# Patient Record
Sex: FEMALE | Race: WHITE | NOT HISPANIC OR LATINO | Employment: OTHER | URBAN - NONMETROPOLITAN AREA
[De-identification: names, ages, dates, MRNs, and addresses within clinical notes are randomized per-mention and may not be internally consistent; named-entity substitution may affect disease eponyms.]

---

## 2017-02-23 ENCOUNTER — OFFICE VISIT (OUTPATIENT)
Dept: INTERNAL MEDICINE | Facility: CLINIC | Age: 57
End: 2017-02-23

## 2017-02-23 VITALS
OXYGEN SATURATION: 98 % | RESPIRATION RATE: 12 BRPM | WEIGHT: 128 LBS | HEART RATE: 72 BPM | DIASTOLIC BLOOD PRESSURE: 80 MMHG | SYSTOLIC BLOOD PRESSURE: 112 MMHG | BODY MASS INDEX: 20.09 KG/M2 | HEIGHT: 67 IN

## 2017-02-23 DIAGNOSIS — E78.49 OTHER HYPERLIPIDEMIA: ICD-10-CM

## 2017-02-23 DIAGNOSIS — K63.89: ICD-10-CM

## 2017-02-23 DIAGNOSIS — M85.80 OSTEOPENIA: Primary | ICD-10-CM

## 2017-02-23 DIAGNOSIS — K21.00 GASTROESOPHAGEAL REFLUX DISEASE WITH ESOPHAGITIS: ICD-10-CM

## 2017-02-23 PROBLEM — E78.5 HYPERLIPIDEMIA: Status: ACTIVE | Noted: 2017-02-23

## 2017-02-23 PROBLEM — R19.8 PAIN ASSOCIATED WITH DEFECATION: Status: ACTIVE | Noted: 2017-02-23

## 2017-02-23 LAB
ALBUMIN SERPL-MCNC: 4.4 G/DL (ref 3.2–4.8)
ALBUMIN/GLOB SERPL: 1.7 G/DL (ref 1.5–2.5)
ALP SERPL-CCNC: 72 U/L (ref 25–100)
ALT SERPL W P-5'-P-CCNC: 14 U/L (ref 7–40)
ANION GAP SERPL CALCULATED.3IONS-SCNC: 2 MMOL/L (ref 3–11)
ARTICHOKE IGE QN: 187 MG/DL (ref 0–130)
AST SERPL-CCNC: 19 U/L (ref 0–33)
BILIRUB SERPL-MCNC: 0.6 MG/DL (ref 0.3–1.2)
BUN BLD-MCNC: 11 MG/DL (ref 9–23)
BUN/CREAT SERPL: 15.7 (ref 7–25)
CALCIUM SPEC-SCNC: 10 MG/DL (ref 8.7–10.4)
CHLORIDE SERPL-SCNC: 103 MMOL/L (ref 99–109)
CHOLEST SERPL-MCNC: 282 MG/DL (ref 0–200)
CO2 SERPL-SCNC: 35 MMOL/L (ref 20–31)
CREAT BLD-MCNC: 0.7 MG/DL (ref 0.6–1.3)
GFR SERPL CREATININE-BSD FRML MDRD: 87 ML/MIN/1.73
GLOBULIN UR ELPH-MCNC: 2.6 GM/DL
GLUCOSE BLD-MCNC: 81 MG/DL (ref 70–100)
HDLC SERPL-MCNC: 76 MG/DL (ref 40–60)
POTASSIUM BLD-SCNC: 4.1 MMOL/L (ref 3.5–5.5)
PROT SERPL-MCNC: 7 G/DL (ref 5.7–8.2)
SODIUM BLD-SCNC: 140 MMOL/L (ref 132–146)
TRIGL SERPL-MCNC: 140 MG/DL (ref 0–150)

## 2017-02-23 PROCEDURE — 36415 COLL VENOUS BLD VENIPUNCTURE: CPT | Performed by: FAMILY MEDICINE

## 2017-02-23 PROCEDURE — 99396 PREV VISIT EST AGE 40-64: CPT | Performed by: FAMILY MEDICINE

## 2017-02-23 PROCEDURE — 80061 LIPID PANEL: CPT | Performed by: FAMILY MEDICINE

## 2017-02-23 PROCEDURE — 99213 OFFICE O/P EST LOW 20 MIN: CPT | Performed by: FAMILY MEDICINE

## 2017-02-23 PROCEDURE — 80053 COMPREHEN METABOLIC PANEL: CPT | Performed by: FAMILY MEDICINE

## 2017-02-23 RX ORDER — OMEPRAZOLE 40 MG/1
40 CAPSULE, DELAYED RELEASE ORAL DAILY
Qty: 90 CAPSULE | Refills: 1 | Status: SHIPPED | OUTPATIENT
Start: 2017-02-23 | End: 2017-07-26

## 2017-02-23 RX ORDER — METRONIDAZOLE 500 MG/1
500 TABLET ORAL 3 TIMES DAILY
Qty: 30 TABLET | Refills: 0 | Status: SHIPPED | OUTPATIENT
Start: 2017-02-23 | End: 2017-03-05

## 2017-02-23 RX ORDER — CALCIUM CARBONATE 200(500)MG
TABLET,CHEWABLE ORAL
COMMUNITY
Start: 2013-07-22

## 2017-02-23 RX ORDER — CIPROFLOXACIN 500 MG/1
500 TABLET, FILM COATED ORAL 2 TIMES DAILY
Qty: 20 TABLET | Refills: 0 | Status: SHIPPED | OUTPATIENT
Start: 2017-02-23 | End: 2017-03-05

## 2017-02-23 RX ORDER — OMEPRAZOLE 40 MG/1
CAPSULE, DELAYED RELEASE ORAL DAILY
COMMUNITY
Start: 2015-02-20 | End: 2017-02-23 | Stop reason: SDUPTHER

## 2017-02-23 RX ORDER — FEXOFENADINE HCL 180 MG/1
TABLET ORAL AS NEEDED
COMMUNITY
Start: 2013-07-22 | End: 2019-10-01

## 2017-02-23 NOTE — PATIENT INSTRUCTIONS
"A. Probiotics - essentially foods and supplements that contain good bacteria            1. over the counter tabs with at least 1 billion CFUs/serving  (look for lactobacillus, saccharomyces, aspergillus) once to twice a day,            2.  Live culture yogurts (that include lactobacilli & bifidobacteria)            3. Kefir            4. Other foods don't have as much research supporting them but include miso, sauerkraut, buttermilk, kimchi,   B. Prebiotics - essentially a food/supplement that feeds the probiotics that you eat (typically is non-digestible by your intestines) - you want about 15 g of these a day            1.  High fiber foods            2.  High \"inulin\" containing foods - banana, chicory root, dandelion greens, garlic, artichokes, jicama, onion - recommended raw)            3. Garlic, asparagus, marsha, whole wheat flour, wheat bran,            4. Lactulose - actually a constipation medical treatment - can also be used as a prebiotic      "

## 2017-02-23 NOTE — PROGRESS NOTES
Physical. Has had a flare-up of acid reflux and right side abd pain, same as pain she had last year. She had constipation again like last time.     Subjective   Marni Hussein is a 56 y.o. female and is here for a comprehensive physical exam. The patient reports problems - see below.    Abdominal pain: getting a new RLQ abdominal pain, similar to the episode in 2015.  Not as severe.  She thinks it was triggered by an episode of constipation.  She took some dulcolax which got a lot of stuff moving, and the pain improved but still getting a burning pain.      GERD  Paitent complains of heartburn. This has been associated with bilious reflux, difficulty swallowing, heartburn, midespigastric pain, nocturnal burning, symptoms primarily relate to meals, and lying down after meals and upper abdominal discomfort.  She denies belching, belching and eructation, dysphagia, early satiety, fullness after meals, regurgitation of undigested food and unexpected weight loss. Symptoms have been present for several months. She denies dysphagia. She has not lost weight. She denies melena, hematochezia, hematemesis, and coffee ground emesis. Medical therapy in the past has included antacids.- - tums.  She had been symptom free for several months, but then this winter has been eating more acidic foods.          Do you take any herbs or supplements that were not prescribed by a doctor? no  Are you taking calcium supplements? Yes  Are you taking aspirin daily? N/A     History:  LMP: Menopause  Menopause: Yes  Last pap date: 2016  Abnormal pap? yes but many years ago.    Period Duration (Days): MENOPAUSE @ 46    OB History    Para Term  AB SAB TAB Ectopic Multiple Living   0 0 0 0 0 0 0 0 0 0               The following portions of the patient's history were reviewed and updated as appropriate: She  has a past medical history of Anemia; Breast injury; Depression; Fibrocystic breast; and Peptic ulceration.  She has  Gastroesophageal reflux disease with esophagitis; Hyperlipidemia; and Osteopenia on her pertinent problem list.  She  has a past surgical history that includes Breast excisional biopsy (Right); Breast cyst excision (1980); and Pelvic laparoscopy (2002).  Her family history includes Asthma in her mother; Brain cancer in her maternal aunt; Breast cancer (age of onset: 54) in her mother; Cancer in her mother; Fibromyalgia in her sister; ANTOLIN disease in her mother; Heart attack in her maternal grandfather, maternal uncle, and paternal uncle; Heart disease in her maternal aunt, maternal grandfather, maternal grandmother, maternal uncle, mother, paternal grandfather, paternal uncle, and paternal uncle; Hyperlipidemia in her brother, father, maternal aunt, maternal aunt, maternal grandfather, maternal grandmother, maternal uncle, mother, paternal grandfather, paternal uncle, paternal uncle, and sister; Hypertension in her brother, father, maternal aunt, maternal aunt, maternal grandfather, maternal grandmother, maternal uncle, maternal uncle, mother, paternal aunt, paternal grandfather, paternal uncle, paternal uncle, and sister; Stroke in her maternal aunt, maternal grandmother, and paternal grandfather; Tuberculosis in her paternal grandmother. There is no history of Ovarian cancer.  She  reports that she has never smoked. She has never used smokeless tobacco. She reports that she drinks alcohol. She reports that she does not use illicit drugs..    Review of Systems  Do you have pain that bothers you in your daily life? no    Review of Systems   Constitutional: Negative for appetite change, chills, fatigue, fever and unexpected weight change.        No malaise   HENT: Negative for ear pain, hearing loss, nosebleeds, rhinorrhea, sore throat, trouble swallowing and voice change.    Eyes: Negative for pain, discharge, redness, itching and visual disturbance.        No dry eyes   Respiratory: Negative for cough, shortness of  "breath and wheezing.         No SOB with exertion  No SOB lying down   Cardiovascular: Negative for chest pain, palpitations and leg swelling.        No leg cramps     Gastrointestinal: Positive for abdominal pain and constipation. Negative for blood in stool, diarrhea, nausea and vomiting.        No change in bowel habits  +heartburn   Endocrine: Negative for cold intolerance, heat intolerance, polydipsia, polyphagia and polyuria.        No hot flashes  No muscle weakness  No feeling of weakness   Genitourinary: Negative for difficulty urinating, dyspareunia, dysuria, frequency, hematuria, menstrual problem, pelvic pain, urgency, vaginal discharge and vaginal pain.        No urinary incontinence  No change in periods   Musculoskeletal: Negative for arthralgias, joint swelling and myalgias.        No limb pain  No limb swelling   Skin: Negative for rash and wound.        No rash  No lesions  No change in mole  No itching   Neurological: Negative for dizziness, seizures, syncope, weakness, numbness and headaches.        No confusion  No tingling  No trouble walking   Hematological: Negative for adenopathy. Does not bruise/bleed easily.   Psychiatric/Behavioral: Negative for confusion, dysphoric mood, sleep disturbance and suicidal ideas. The patient is not nervous/anxious.         No change in personality         Objective   Visit Vitals   • /80   • Pulse 72   • Resp 12   • Ht 66.5\" (168.9 cm)   • Wt 128 lb (58.1 kg)   • SpO2 98%   • BMI 20.35 kg/m2       Physical Exam   Constitutional: She is oriented to person, place, and time. She appears well-developed and well-nourished. No distress.   HENT:   Head: Normocephalic and atraumatic.   Right Ear: Tympanic membrane, external ear and ear canal normal.   Left Ear: Tympanic membrane, external ear and ear canal normal.   Nose: No rhinorrhea.   Mouth/Throat: Uvula is midline, oropharynx is clear and moist and mucous membranes are normal. No posterior oropharyngeal " erythema. No tonsillar exudate.   Eyes: Conjunctivae and lids are normal. Pupils are equal, round, and reactive to light. Right eye exhibits no discharge. Left eye exhibits no discharge. No scleral icterus.   Neck: Trachea normal, normal range of motion and phonation normal. Neck supple. No thyroid mass and no thyromegaly present.   Cardiovascular: Normal rate, regular rhythm and normal heart sounds.    No murmur heard.  Pulmonary/Chest: Effort normal and breath sounds normal.   Abdominal: Soft. Normal appearance. There is no splenomegaly or hepatomegaly. There is tenderness in the right lower quadrant. There is no rebound and no guarding. No hernia.   Musculoskeletal: Normal range of motion. She exhibits no edema, tenderness or deformity.   Lymphadenopathy:        Head (right side): No submental, no submandibular, no preauricular and no posterior auricular adenopathy present.        Head (left side): No submental, no submandibular, no preauricular and no posterior auricular adenopathy present.     She has no cervical adenopathy.        Right: No supraclavicular adenopathy present.        Left: No supraclavicular adenopathy present.   Neurological: She is alert and oriented to person, place, and time. She has normal strength.   Reflex Scores:       Patellar reflexes are 2+ on the right side and 2+ on the left side.  Skin: Skin is warm and dry. No rash noted. No pallor.   Psychiatric: She has a normal mood and affect. Her behavior is normal. Judgment and thought content normal.          Assessment/Plan   Healthy female exam.     1. 1. Osteopenia    - DEXA Bone Density Axial    2. Gastroesophageal reflux disease with esophagitis  worsening  - omeprazole (priLOSEC) 40 MG capsule; Take 1 capsule by mouth Daily.  Dispense: 90 capsule; Refill: 1  - Comprehensive Metabolic Panel; Future  - Comprehensive Metabolic Panel    3. Other hyperlipidemia  recheck  - Lipid Panel; Future  - Comprehensive Metabolic Panel; Future  -  Lipid Panel  - Comprehensive Metabolic Panel    4. Ileocecal valve syndrome  recurrent  - ciprofloxacin (CIPRO) 500 MG tablet; Take 1 tablet by mouth 2 (Two) Times a Day for 10 days.  Dispense: 20 tablet; Refill: 0  - metroNIDAZOLE (FLAGYL) 500 MG tablet; Take 1 tablet by mouth 3 (Three) Times a Day for 10 days.  Dispense: 30 tablet; Refill: 0      2. Patient Counseling:  --Nutrition: Stressed importance of moderation in sodium/caffeine intake, saturated fat and cholesterol, caloric balance, sufficient intake of fresh fruits, vegetables, fiber, calcium, iron, and 1 g folate supplementation if of childbearing age.   --Discussed the issue of calcium supplement, and the daily use of baby aspirin if applicable.  --Exercise: Stressed the importance of regular exercise.   --Substance Abuse: Discussed cessation/primary prevention of tobacco (if applicable), alcohol, or other drug use (if applicable); driving or other dangerous activities under the influence; availability of treatment for abuse.    --Sexuality: Discussed sexually transmitted diseases, partner selection, use of condoms, avoidance of unintended pregnancy  and contraceptive alternatives.   --Injury prevention: Discussed safety belts, safety helmets, smoke detector, smoking near bedding or upholstery.   --Dental health: Discussed importance of regular tooth brushing, flossing, and dental visits.  --Immunizations reviewed.  --Discussed benefits of screening colonoscopy (if applicable).  --After hours service discussed with patient    3. Discussed the patient's BMI with her.  The BMI is in the acceptable range  4. Follow up in 6 months

## 2017-06-06 ENCOUNTER — TELEPHONE (OUTPATIENT)
Dept: INTERNAL MEDICINE | Facility: CLINIC | Age: 57
End: 2017-06-06

## 2017-06-06 NOTE — TELEPHONE ENCOUNTER
Patient said she got an email saying there was a message waiting from her doctor. She said she had to answer all these questions before it would pull the message up. She said she was afraid of doing it in case it was a scam or something, so she figured she'd give us a call.    She also said she signed a packet for a living will a while ago, and it was supposed to be faxed somewhere and then returned to us and she said she noticed it wasn't in her MyChart, so she wanted to know the status of where that was.    She'd like a callback.

## 2017-06-08 NOTE — TELEPHONE ENCOUNTER
Spoke with pt. She said she received an e-mail from [employer] that was from her doctor's office.  Asked a bunch of questions, she answered a few then stopped in case it was a scam.     Biometric screening was faxed 3-30-17. She said her company did not get it. Will fax again today and leave copy for her at . She will stop by and pick it up.

## 2017-07-14 ENCOUNTER — TRANSCRIBE ORDERS (OUTPATIENT)
Dept: MAMMOGRAPHY | Facility: HOSPITAL | Age: 57
End: 2017-07-14

## 2017-07-14 DIAGNOSIS — Z12.31 VISIT FOR SCREENING MAMMOGRAM: Primary | ICD-10-CM

## 2017-07-26 ENCOUNTER — OFFICE VISIT (OUTPATIENT)
Dept: INTERNAL MEDICINE | Facility: CLINIC | Age: 57
End: 2017-07-26

## 2017-07-26 VITALS
DIASTOLIC BLOOD PRESSURE: 62 MMHG | TEMPERATURE: 98.3 F | WEIGHT: 134 LBS | OXYGEN SATURATION: 96 % | SYSTOLIC BLOOD PRESSURE: 104 MMHG | HEIGHT: 67 IN | RESPIRATION RATE: 12 BRPM | HEART RATE: 72 BPM | BODY MASS INDEX: 21.03 KG/M2

## 2017-07-26 DIAGNOSIS — G89.29 CHRONIC LEFT SHOULDER PAIN: ICD-10-CM
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
## 2017-07-26 DIAGNOSIS — M25.512 CHRONIC LEFT SHOULDER PAIN: ICD-10-CM

## 2017-07-26 DIAGNOSIS — S70.361A TICK BITE OF RIGHT THIGH, INITIAL ENCOUNTER: Primary | ICD-10-CM

## 2017-07-26 PROCEDURE — 99213 OFFICE O/P EST LOW 20 MIN: CPT | Performed by: NURSE PRACTITIONER

## 2017-07-26 NOTE — PROGRESS NOTES
Chief Complaint / Reason:      Chief Complaint   Patient presents with   • Tick Removal     TICK IN LEG       Subjective     HPI  Patient presents today with complaints of tick bite on her right leg specifically upper thigh.  She states that she was in [place] at her family's home [place] a [place] where she obtained the tick.  She denies being in the lids or any tall grass but states her family has a inside pet that does go outside to use the bathroom.  The tick was found and it appeared to be embedded in her scan and her family called it off but was able to remove a small portion that currently remains in her skin.  The site does not appear to be infected nor did the patient have a fever at this time.  Patient denied any neurological symptoms.  She states that she gets take baths frequently and does not use any repellent.  Patient also states that her left shoulder has been giving her a lot of trouble, states that she had an MRI in the past which showed bulging disc in her cervical region.  Patient denies numbness or tingling but does state that it does shoot pain from her neck down to her.  Patient denies chest pain, shortness of breath, or heart palpitations.  History taken from: patient    PMH/FH/Social History were reviewed and updated appropriately in the electronic medical record.     Review of Systems:   Review of Systems   Constitutional: Negative.    HENT: Negative.    Respiratory: Negative.    Cardiovascular: Negative.    Skin:        Tick bite right upper thigh     Neurological: Negative.  Negative for weakness and headaches.   Hematological: Negative for adenopathy.     All other systems were reviewed and are negative.  Exceptions are noted in the subjective or above.      Objective     Vital Signs  Vitals:    07/26/17 1427   BP: 104/62   Pulse: 72   Resp: 12   Temp: 98.3 °F (36.8 °C)   SpO2: 96%       Body mass index is 21.3 kg/(m^2).    Physical Exam   Constitutional: She is oriented to  person, place, and time. She appears well-developed.   HENT:   Head: Normocephalic.   Cardiovascular: Normal rate, regular rhythm, normal heart sounds and intact distal pulses.    Pulmonary/Chest: Effort normal.   Musculoskeletal: She exhibits tenderness.        Left shoulder: She exhibits decreased range of motion, tenderness and crepitus.   Neurological: She is alert and oriented to person, place, and time.   Skin: Skin is warm and dry. There is erythema.        The head of the tick removed by using the tip of the needle and tweezers.  The area was cleaned with Betadine and then with alcohol.  The remaining portion of the tick was removed cleaned again with Betadine and alcohol and a Band-Aid applied.  Scant bleeding noted.  Patient tolerated well   Nursing note and vitals reviewed.      Medication Review:     Current Outpatient Prescriptions:   •  calcium carbonate (TUMS) 500 MG chewable tablet, Chew., Disp: , Rfl:   •  CALCIUM-VITAMIN D PO, Take  by mouth Daily., Disp: , Rfl:   •  fexofenadine (ALLEGRA ALLERGY) 180 MG tablet, Take  by mouth As Needed., Disp: , Rfl:     Assessment/Plan   Marni was seen today for tick removal.    Diagnoses and all orders for this visit:    Tick bite of right thigh, initial encounter  Discussed worsening signs and symptoms and when to seek medical attention.  Discussed prevention management of tick bites  Recommend patient increase water intake.  Chronic left shoulder pain  Recommend patient avoid heavy lifting or pulling and tugging.  Recommend patient not lie on left side while sleeping.  Discussed worsening signs and symptoms  Arlette Becker, FRANCOISE  07/26/2017

## 2017-08-10 ENCOUNTER — HOSPITAL ENCOUNTER (OUTPATIENT)
Dept: MAMMOGRAPHY | Facility: HOSPITAL | Age: 57
Discharge: HOME | End: 2017-08-10
Attending: OBSTETRICS & GYNECOLOGY | Admitting: OBSTETRICS & GYNECOLOGY

## 2017-08-10 DIAGNOSIS — Z12.31 VISIT FOR SCREENING MAMMOGRAM: ICD-10-CM

## 2017-08-10 PROCEDURE — 77067 SCR MAMMO BI INCL CAD: CPT | Performed by: RADIOLOGY

## 2017-08-10 PROCEDURE — 77063 BREAST TOMOSYNTHESIS BI: CPT

## 2017-08-10 PROCEDURE — 77063 BREAST TOMOSYNTHESIS BI: CPT | Performed by: RADIOLOGY

## 2017-08-10 PROCEDURE — G0202 SCR MAMMO BI INCL CAD: HCPCS

## 2017-09-23 ENCOUNTER — ANESTHESIA EVENT (OUTPATIENT)
Dept: PERIOP | Facility: HOSPITAL | Age: 57
End: 2017-09-23

## 2017-09-23 ENCOUNTER — APPOINTMENT (OUTPATIENT)
Dept: CT IMAGING | Facility: HOSPITAL | Age: 57
End: 2017-09-23

## 2017-09-23 ENCOUNTER — ANESTHESIA (OUTPATIENT)
Dept: PERIOP | Facility: HOSPITAL | Age: 57
End: 2017-09-23

## 2017-09-23 ENCOUNTER — HOSPITAL ENCOUNTER (OUTPATIENT)
Facility: HOSPITAL | Age: 57
Discharge: HOME | End: 2017-09-24
Attending: EMERGENCY MEDICINE | Admitting: SURGERY

## 2017-09-23 ENCOUNTER — PREP FOR SURGERY (OUTPATIENT)
Dept: OTHER | Facility: HOSPITAL | Age: 57
End: 2017-09-23

## 2017-09-23 DIAGNOSIS — K35.80 ACUTE APPENDICITIS, UNSPECIFIED ACUTE APPENDICITIS TYPE: Primary | ICD-10-CM

## 2017-09-23 DIAGNOSIS — K35.30 ACUTE APPENDICITIS WITH LOCALIZED PERITONITIS: ICD-10-CM

## 2017-09-23 DIAGNOSIS — K35.30 ACUTE APPENDICITIS WITH LOCALIZED PERITONITIS: Primary | ICD-10-CM

## 2017-09-23 LAB
ALBUMIN SERPL-MCNC: 4.4 G/DL (ref 3.5–5)
ALBUMIN/GLOB SERPL: 1.6 G/DL (ref 1–2)
ALP SERPL-CCNC: 68 U/L (ref 38–126)
ALT SERPL W P-5'-P-CCNC: 28 U/L (ref 13–69)
ANION GAP SERPL CALCULATED.3IONS-SCNC: 17.7 MMOL/L
AST SERPL-CCNC: 20 U/L (ref 15–46)
B-HCG UR QL: NEGATIVE
BACTERIA UR QL AUTO: ABNORMAL /HPF
BASOPHILS # BLD AUTO: 0.02 10*3/MM3 (ref 0–0.2)
BASOPHILS NFR BLD AUTO: 0.1 % (ref 0–2.5)
BILIRUB SERPL-MCNC: 0.6 MG/DL (ref 0.2–1.3)
BILIRUB UR QL STRIP: NEGATIVE
BUN BLD-MCNC: 14 MG/DL (ref 7–20)
BUN/CREAT SERPL: 20 (ref 7.1–23.5)
CALCIUM SPEC-SCNC: 9.3 MG/DL (ref 8.4–10.2)
CHLORIDE SERPL-SCNC: 104 MMOL/L (ref 98–107)
CLARITY UR: CLEAR
CO2 SERPL-SCNC: 23 MMOL/L (ref 26–30)
COLOR UR: YELLOW
CREAT BLD-MCNC: 0.7 MG/DL (ref 0.6–1.3)
DEPRECATED RDW RBC AUTO: 40.5 FL (ref 37–54)
EOSINOPHIL # BLD AUTO: 0 10*3/MM3 (ref 0–0.7)
EOSINOPHIL NFR BLD AUTO: 0 % (ref 0–7)
ERYTHROCYTE [DISTWIDTH] IN BLOOD BY AUTOMATED COUNT: 12.4 % (ref 11.5–14.5)
GFR SERPL CREATININE-BSD FRML MDRD: 86 ML/MIN/1.73
GLOBULIN UR ELPH-MCNC: 2.7 GM/DL
GLUCOSE BLD-MCNC: 156 MG/DL (ref 74–98)
GLUCOSE UR STRIP-MCNC: NEGATIVE MG/DL
HCT VFR BLD AUTO: 37.8 % (ref 37–47)
HGB BLD-MCNC: 12.4 G/DL (ref 12–16)
HGB UR QL STRIP.AUTO: ABNORMAL
HYALINE CASTS UR QL AUTO: ABNORMAL /LPF
IMM GRANULOCYTES # BLD: 0.07 10*3/MM3 (ref 0–0.06)
IMM GRANULOCYTES NFR BLD: 0.5 % (ref 0–0.6)
KETONES UR QL STRIP: ABNORMAL
LEUKOCYTE ESTERASE UR QL STRIP.AUTO: NEGATIVE
LIPASE SERPL-CCNC: 83 U/L (ref 23–300)
LYMPHOCYTES # BLD AUTO: 0.58 10*3/MM3 (ref 0.6–3.4)
LYMPHOCYTES NFR BLD AUTO: 4 % (ref 10–50)
MCH RBC QN AUTO: 29.8 PG (ref 27–31)
MCHC RBC AUTO-ENTMCNC: 32.8 G/DL (ref 30–37)
MCV RBC AUTO: 90.9 FL (ref 81–99)
MONOCYTES # BLD AUTO: 0.94 10*3/MM3 (ref 0–0.9)
MONOCYTES NFR BLD AUTO: 6.5 % (ref 0–12)
NEUTROPHILS # BLD AUTO: 12.84 10*3/MM3 (ref 2–6.9)
NEUTROPHILS NFR BLD AUTO: 88.9 % (ref 37–80)
NITRITE UR QL STRIP: NEGATIVE
NRBC BLD MANUAL-RTO: 0 /100 WBC (ref 0–0)
PH UR STRIP.AUTO: 7.5 [PH] (ref 5–8)
PLATELET # BLD AUTO: 257 10*3/MM3 (ref 130–400)
PMV BLD AUTO: 10.9 FL (ref 6–12)
POTASSIUM BLD-SCNC: 3.7 MMOL/L (ref 3.5–5.1)
PROT SERPL-MCNC: 7.1 G/DL (ref 6.3–8.2)
PROT UR QL STRIP: NEGATIVE
RBC # BLD AUTO: 4.16 10*6/MM3 (ref 4.2–5.4)
RBC # UR: ABNORMAL /HPF
REF LAB TEST METHOD: ABNORMAL
SODIUM BLD-SCNC: 141 MMOL/L (ref 137–145)
SP GR UR STRIP: 1.01 (ref 1–1.03)
SQUAMOUS #/AREA URNS HPF: ABNORMAL /HPF
UROBILINOGEN UR QL STRIP: ABNORMAL
WBC NRBC COR # BLD: 14.45 10*3/MM3 (ref 4.8–10.8)
WBC UR QL AUTO: ABNORMAL /HPF

## 2017-09-23 PROCEDURE — 25010000002 DEXAMETHASONE PER 1 MG: Performed by: NURSE ANESTHETIST, CERTIFIED REGISTERED

## 2017-09-23 PROCEDURE — 81025 URINE PREGNANCY TEST: CPT | Performed by: PHYSICIAN ASSISTANT

## 2017-09-23 PROCEDURE — 99024 POSTOP FOLLOW-UP VISIT: CPT | Performed by: SURGERY

## 2017-09-23 PROCEDURE — 74177 CT ABD & PELVIS W/CONTRAST: CPT

## 2017-09-23 PROCEDURE — 80053 COMPREHEN METABOLIC PANEL: CPT | Performed by: PHYSICIAN ASSISTANT

## 2017-09-23 PROCEDURE — 99220 PR INITIAL OBSERVATION CARE/DAY 70 MINUTES: CPT | Performed by: SURGERY

## 2017-09-23 PROCEDURE — 44970 LAPAROSCOPY APPENDECTOMY: CPT | Performed by: SURGERY

## 2017-09-23 PROCEDURE — 25010000002 ONDANSETRON PER 1 MG: Performed by: EMERGENCY MEDICINE

## 2017-09-23 PROCEDURE — 81001 URINALYSIS AUTO W/SCOPE: CPT | Performed by: PHYSICIAN ASSISTANT

## 2017-09-23 PROCEDURE — 96374 THER/PROPH/DIAG INJ IV PUSH: CPT

## 2017-09-23 PROCEDURE — 25010000002 ONDANSETRON PER 1 MG: Performed by: NURSE ANESTHETIST, CERTIFIED REGISTERED

## 2017-09-23 PROCEDURE — 25010000002 FENTANYL CITRATE (PF) 100 MCG/2ML SOLUTION: Performed by: NURSE ANESTHETIST, CERTIFIED REGISTERED

## 2017-09-23 PROCEDURE — 99284 EMERGENCY DEPT VISIT MOD MDM: CPT

## 2017-09-23 PROCEDURE — 25010000002 PROPOFOL 200 MG/20ML EMULSION: Performed by: NURSE ANESTHETIST, CERTIFIED REGISTERED

## 2017-09-23 PROCEDURE — 93005 ELECTROCARDIOGRAM TRACING: CPT | Performed by: PHYSICIAN ASSISTANT

## 2017-09-23 PROCEDURE — 85025 COMPLETE CBC W/AUTO DIFF WBC: CPT | Performed by: PHYSICIAN ASSISTANT

## 2017-09-23 PROCEDURE — G0378 HOSPITAL OBSERVATION PER HR: HCPCS

## 2017-09-23 PROCEDURE — 25010000002 KETOROLAC TROMETHAMINE PER 15 MG: Performed by: NURSE ANESTHETIST, CERTIFIED REGISTERED

## 2017-09-23 PROCEDURE — 0 IOPAMIDOL 61 % SOLUTION: Performed by: STUDENT IN AN ORGANIZED HEALTH CARE EDUCATION/TRAINING PROGRAM

## 2017-09-23 PROCEDURE — 96361 HYDRATE IV INFUSION ADD-ON: CPT

## 2017-09-23 PROCEDURE — 83690 ASSAY OF LIPASE: CPT | Performed by: PHYSICIAN ASSISTANT

## 2017-09-23 PROCEDURE — 25010000002 MIDAZOLAM PER 1 MG: Performed by: NURSE ANESTHETIST, CERTIFIED REGISTERED

## 2017-09-23 RX ORDER — NEOSTIGMINE METHYLSULFATE 5 MG/5 ML
SYRINGE (ML) INTRAVENOUS AS NEEDED
Status: DISCONTINUED | OUTPATIENT
Start: 2017-09-23 | End: 2017-09-23 | Stop reason: SURG

## 2017-09-23 RX ORDER — FENTANYL CITRATE 50 UG/ML
INJECTION, SOLUTION INTRAMUSCULAR; INTRAVENOUS AS NEEDED
Status: DISCONTINUED | OUTPATIENT
Start: 2017-09-23 | End: 2017-09-23 | Stop reason: SURG

## 2017-09-23 RX ORDER — MEPERIDINE HYDROCHLORIDE 25 MG/ML
25 INJECTION INTRAMUSCULAR; INTRAVENOUS; SUBCUTANEOUS
Status: DISCONTINUED | OUTPATIENT
Start: 2017-09-23 | End: 2017-09-23

## 2017-09-23 RX ORDER — ONDANSETRON 4 MG/1
4 TABLET, FILM COATED ORAL ONCE AS NEEDED
Status: DISCONTINUED | OUTPATIENT
Start: 2017-09-23 | End: 2017-09-23

## 2017-09-23 RX ORDER — SODIUM CHLORIDE 9 MG/ML
100 INJECTION, SOLUTION INTRAVENOUS CONTINUOUS
Status: CANCELLED | OUTPATIENT
Start: 2017-09-23

## 2017-09-23 RX ORDER — PROMETHAZINE HYDROCHLORIDE 25 MG/ML
6.25 INJECTION, SOLUTION INTRAMUSCULAR; INTRAVENOUS ONCE
Status: DISCONTINUED | OUTPATIENT
Start: 2017-09-23 | End: 2017-09-23

## 2017-09-23 RX ORDER — SODIUM CHLORIDE, SODIUM LACTATE, POTASSIUM CHLORIDE, CALCIUM CHLORIDE 600; 310; 30; 20 MG/100ML; MG/100ML; MG/100ML; MG/100ML
1000 INJECTION, SOLUTION INTRAVENOUS CONTINUOUS PRN
Status: DISCONTINUED | OUTPATIENT
Start: 2017-09-23 | End: 2017-09-23 | Stop reason: HOSPADM

## 2017-09-23 RX ORDER — SODIUM CHLORIDE 0.9 % (FLUSH) 0.9 %
3 SYRINGE (ML) INJECTION AS NEEDED
Status: DISCONTINUED | OUTPATIENT
Start: 2017-09-23 | End: 2017-09-23 | Stop reason: HOSPADM

## 2017-09-23 RX ORDER — ONDANSETRON 2 MG/ML
INJECTION INTRAMUSCULAR; INTRAVENOUS AS NEEDED
Status: DISCONTINUED | OUTPATIENT
Start: 2017-09-23 | End: 2017-09-23 | Stop reason: SURG

## 2017-09-23 RX ORDER — GLYCOPYRROLATE 0.2 MG/ML
INJECTION INTRAMUSCULAR; INTRAVENOUS AS NEEDED
Status: DISCONTINUED | OUTPATIENT
Start: 2017-09-23 | End: 2017-09-23 | Stop reason: SURG

## 2017-09-23 RX ORDER — ONDANSETRON 2 MG/ML
4 INJECTION INTRAMUSCULAR; INTRAVENOUS ONCE
Status: COMPLETED | OUTPATIENT
Start: 2017-09-23 | End: 2017-09-23

## 2017-09-23 RX ORDER — HYDROCODONE BITARTRATE AND ACETAMINOPHEN 7.5; 325 MG/1; MG/1
1 TABLET ORAL ONCE AS NEEDED
Status: DISCONTINUED | OUTPATIENT
Start: 2017-09-23 | End: 2017-09-23

## 2017-09-23 RX ORDER — IBUPROFEN 600 MG/1
600 TABLET ORAL EVERY 6 HOURS PRN
Status: DISCONTINUED | OUTPATIENT
Start: 2017-09-23 | End: 2017-09-23

## 2017-09-23 RX ORDER — PROMETHAZINE HYDROCHLORIDE 25 MG/ML
12.5 INJECTION, SOLUTION INTRAMUSCULAR; INTRAVENOUS ONCE AS NEEDED
Status: DISCONTINUED | OUTPATIENT
Start: 2017-09-23 | End: 2017-09-23

## 2017-09-23 RX ORDER — PROPOFOL 10 MG/ML
INJECTION, EMULSION INTRAVENOUS AS NEEDED
Status: DISCONTINUED | OUTPATIENT
Start: 2017-09-23 | End: 2017-09-23 | Stop reason: SURG

## 2017-09-23 RX ORDER — BUPIVACAINE HYDROCHLORIDE 5 MG/ML
INJECTION, SOLUTION EPIDURAL; INTRACAUDAL AS NEEDED
Status: DISCONTINUED | OUTPATIENT
Start: 2017-09-23 | End: 2017-09-23 | Stop reason: HOSPADM

## 2017-09-23 RX ORDER — ROCURONIUM BROMIDE 10 MG/ML
INJECTION, SOLUTION INTRAVENOUS AS NEEDED
Status: DISCONTINUED | OUTPATIENT
Start: 2017-09-23 | End: 2017-09-23 | Stop reason: SURG

## 2017-09-23 RX ORDER — BUPIVACAINE HYDROCHLORIDE 5 MG/ML
INJECTION, SOLUTION EPIDURAL; INTRACAUDAL
Status: DISCONTINUED
Start: 2017-09-23 | End: 2017-09-24 | Stop reason: HOSPADM

## 2017-09-23 RX ORDER — ONDANSETRON 2 MG/ML
4 INJECTION INTRAMUSCULAR; INTRAVENOUS ONCE AS NEEDED
Status: DISCONTINUED | OUTPATIENT
Start: 2017-09-23 | End: 2017-09-23

## 2017-09-23 RX ORDER — KETOROLAC TROMETHAMINE 30 MG/ML
INJECTION, SOLUTION INTRAMUSCULAR; INTRAVENOUS AS NEEDED
Status: DISCONTINUED | OUTPATIENT
Start: 2017-09-23 | End: 2017-09-23 | Stop reason: SURG

## 2017-09-23 RX ORDER — SODIUM CHLORIDE 9 MG/ML
INJECTION, SOLUTION INTRAVENOUS CONTINUOUS PRN
Status: DISCONTINUED | OUTPATIENT
Start: 2017-09-23 | End: 2017-09-23 | Stop reason: HOSPADM

## 2017-09-23 RX ORDER — SODIUM CHLORIDE 0.9 % (FLUSH) 0.9 %
10 SYRINGE (ML) INJECTION AS NEEDED
Status: DISCONTINUED | OUTPATIENT
Start: 2017-09-23 | End: 2017-09-23

## 2017-09-23 RX ORDER — MEPERIDINE HYDROCHLORIDE 50 MG/ML
INJECTION INTRAMUSCULAR; INTRAVENOUS; SUBCUTANEOUS AS NEEDED
Status: DISCONTINUED | OUTPATIENT
Start: 2017-09-23 | End: 2017-09-23 | Stop reason: SURG

## 2017-09-23 RX ORDER — DEXAMETHASONE SODIUM PHOSPHATE 4 MG/ML
INJECTION, SOLUTION INTRA-ARTICULAR; INTRALESIONAL; INTRAMUSCULAR; INTRAVENOUS; SOFT TISSUE AS NEEDED
Status: DISCONTINUED | OUTPATIENT
Start: 2017-09-23 | End: 2017-09-23 | Stop reason: SURG

## 2017-09-23 RX ORDER — MIDAZOLAM HYDROCHLORIDE 1 MG/ML
INJECTION INTRAMUSCULAR; INTRAVENOUS AS NEEDED
Status: DISCONTINUED | OUTPATIENT
Start: 2017-09-23 | End: 2017-09-23 | Stop reason: SURG

## 2017-09-23 RX ORDER — SODIUM CHLORIDE 9 MG/ML
100 INJECTION, SOLUTION INTRAVENOUS CONTINUOUS
Status: DISCONTINUED | OUTPATIENT
Start: 2017-09-23 | End: 2017-09-23

## 2017-09-23 RX ORDER — HYDROCODONE BITARTRATE AND ACETAMINOPHEN 7.5; 325 MG/1; MG/1
1-2 TABLET ORAL EVERY 4 HOURS PRN
Qty: 20 TABLET | Refills: 0 | Status: SHIPPED | OUTPATIENT
Start: 2017-09-23 | End: 2017-10-09

## 2017-09-23 RX ADMIN — Medication 3 MG: at 22:15

## 2017-09-23 RX ADMIN — SODIUM CHLORIDE, POTASSIUM CHLORIDE, SODIUM LACTATE AND CALCIUM CHLORIDE: 600; 310; 30; 20 INJECTION, SOLUTION INTRAVENOUS at 21:25

## 2017-09-23 RX ADMIN — ROCURONIUM BROMIDE 5 MG: 10 INJECTION INTRAVENOUS at 21:41

## 2017-09-23 RX ADMIN — DEXAMETHASONE SODIUM PHOSPHATE 4 MG: 4 INJECTION, SOLUTION INTRAMUSCULAR; INTRAVENOUS at 21:50

## 2017-09-23 RX ADMIN — FENTANYL CITRATE 100 MCG: 50 INJECTION, SOLUTION INTRAMUSCULAR; INTRAVENOUS at 21:42

## 2017-09-23 RX ADMIN — ROCURONIUM BROMIDE 5 MG: 10 INJECTION INTRAVENOUS at 21:55

## 2017-09-23 RX ADMIN — MEPERIDINE HYDROCHLORIDE 25 MG: 50 INJECTION INTRAMUSCULAR; INTRAVENOUS; SUBCUTANEOUS at 21:33

## 2017-09-23 RX ADMIN — FENTANYL CITRATE 50 MCG: 50 INJECTION, SOLUTION INTRAMUSCULAR; INTRAVENOUS at 22:10

## 2017-09-23 RX ADMIN — MIDAZOLAM HYDROCHLORIDE 1 MG: 1 INJECTION, SOLUTION INTRAMUSCULAR; INTRAVENOUS at 21:33

## 2017-09-23 RX ADMIN — LIDOCAINE HYDROCHLORIDE 40 MG: 20 INJECTION, SOLUTION INTRAVENOUS at 21:42

## 2017-09-23 RX ADMIN — ONDANSETRON 4 MG: 2 INJECTION INTRAMUSCULAR; INTRAVENOUS at 21:50

## 2017-09-23 RX ADMIN — KETOROLAC TROMETHAMINE 30 MG: 30 INJECTION, SOLUTION INTRAMUSCULAR at 22:22

## 2017-09-23 RX ADMIN — MEPERIDINE HYDROCHLORIDE 25 MG: 50 INJECTION INTRAMUSCULAR; INTRAVENOUS; SUBCUTANEOUS at 22:00

## 2017-09-23 RX ADMIN — ROCURONIUM BROMIDE 5 MG: 10 INJECTION INTRAVENOUS at 22:06

## 2017-09-23 RX ADMIN — SODIUM CHLORIDE 1000 ML: 9 INJECTION, SOLUTION INTRAVENOUS at 18:15

## 2017-09-23 RX ADMIN — FENTANYL CITRATE 50 MCG: 50 INJECTION, SOLUTION INTRAMUSCULAR; INTRAVENOUS at 21:59

## 2017-09-23 RX ADMIN — IOPAMIDOL 100 ML: 612 INJECTION, SOLUTION INTRAVENOUS at 19:20

## 2017-09-23 RX ADMIN — PROPOFOL 100 MG: 10 INJECTION, EMULSION INTRAVENOUS at 21:42

## 2017-09-23 RX ADMIN — ONDANSETRON 4 MG: 2 INJECTION INTRAMUSCULAR; INTRAVENOUS at 17:58

## 2017-09-23 RX ADMIN — GLYCOPYRROLATE 0.6 MG: 0.2 INJECTION, SOLUTION INTRAMUSCULAR; INTRAVENOUS at 22:15

## 2017-09-23 RX ADMIN — ROCURONIUM BROMIDE 15 MG: 10 INJECTION INTRAVENOUS at 21:42

## 2017-09-24 VITALS
DIASTOLIC BLOOD PRESSURE: 60 MMHG | TEMPERATURE: 99.5 F | HEIGHT: 67 IN | BODY MASS INDEX: 20.4 KG/M2 | OXYGEN SATURATION: 99 % | SYSTOLIC BLOOD PRESSURE: 113 MMHG | HEART RATE: 68 BPM | RESPIRATION RATE: 12 BRPM | WEIGHT: 130 LBS

## 2017-09-24 NOTE — ANESTHESIA PROCEDURE NOTES
Airway  Urgency: elective    Airway not difficult    General Information and Staff    Patient location during procedure: OR  CRNA: VIRIDIANA AVILA    Indications and Patient Condition  Indications for airway management: airway protection    Preoxygenated: yes      Final Airway Details  Final airway type: endotracheal airway      Successful airway: ETT  Cuffed: yes   Successful intubation technique: direct laryngoscopy  Facilitating devices/methods: intubating stylet and cricoid pressure  Endotracheal tube insertion site: oral  Blade: Howard  Blade size: #2  ETT size: 7.0 mm  Cormack-Lehane Classification: grade I - full view of glottis  Placement verified by: chest auscultation, capnometry and palpation of cuff   Measured from: lips  ETT to lips (cm): 21  Number of attempts at approach: 1    Additional Comments  RSI with adequate Cricoid pressure applied. Dentition and Lips as pre-induction. ETT cuff inflated to minimal occlusive pressure.

## 2017-09-24 NOTE — ANESTHESIA POSTPROCEDURE EVALUATION
Patient: Marni Hussein    Procedure Summary     Date Anesthesia Start Anesthesia Stop Room / Location    09/23/17 [id number]  [facility] OR 4 / [facility]       Procedure Diagnosis Surgeon Provider    APPENDECTOMY LAPAROSCOPIC (N/A Abdomen); APPENDECTOMY LAPAROSCOPIC (N/A Abdomen) Acute appendicitis with localized peritonitis  (ACUTE APPENDICITIS; Acute appendicitis with localized peritonitis [K35.3]) MD Ryan Ashraf CRNA          Anesthesia Type: general  Last vitals  BP     122/59   Temp     99.5   Pulse 76      Resp     13   SpO2 100         Post Anesthesia Care and Evaluation    Patient location during evaluation: PACU  Patient participation: complete - patient participated  Level of consciousness: awake  Pain score: 0  Pain management: adequate  Airway patency: patent  Anesthetic complications: No anesthetic complications  PONV Status: none  Cardiovascular status: acceptable  Respiratory status: acceptable, face mask, spontaneous ventilation and nonlabored ventilation  Hydration status: acceptable

## 2017-09-24 NOTE — ANESTHESIA PREPROCEDURE EVALUATION
Anesthesia Evaluation     Patient summary reviewed and Nursing notes reviewed   no history of anesthetic complications:  NPO Solid Status: > 8 hours  NPO Liquid Status: Waived due to emergency     Airway   Mallampati: I  TM distance: >3 FB  Neck ROM: full  no difficulty expected  Dental - normal exam     Pulmonary - negative pulmonary ROS and normal exam    breath sounds clear to auscultation  (-) not a smoker  Cardiovascular - normal exam    ECG reviewed  Rhythm: regular  Rate: normal    (+) hyperlipidemia    ROS comment: EKG   SR 84, low voltage in chest leads    Neuro/Psych  (+) psychiatric history Depression,    GI/Hepatic/Renal/Endo    (+)  GERD, PUD,     Musculoskeletal (-) negative ROS    Abdominal    Substance History - negative use     OB/GYN negative ob/gyn ROS         Other - negative ROS                                   Anesthesia Plan    ASA 2 - emergent     general   Rapid sequence(Risks and benefits of general anesthesia discussed including risk of aspiration, recall and dental damage. All patient questions answered.  Patient told a breathing tube will be used to manage the airway.    Will continue with plan of care.)  intravenous induction   Anesthetic plan and risks discussed with patient.
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
## 2017-09-28 LAB
LAB AP CASE REPORT: NORMAL
Lab: NORMAL
PATH REPORT.FINAL DX SPEC: NORMAL

## 2017-10-09 ENCOUNTER — OFFICE VISIT (OUTPATIENT)
Dept: SURGERY | Facility: CLINIC | Age: 57
End: 2017-10-09

## 2017-10-09 VITALS
BODY MASS INDEX: 20.09 KG/M2 | TEMPERATURE: 98.6 F | OXYGEN SATURATION: 99 % | RESPIRATION RATE: 16 BRPM | WEIGHT: 128 LBS | DIASTOLIC BLOOD PRESSURE: 64 MMHG | HEART RATE: 76 BPM | SYSTOLIC BLOOD PRESSURE: 102 MMHG | HEIGHT: 67 IN

## 2017-10-09 DIAGNOSIS — Z48.89 POSTOPERATIVE VISIT: Primary | ICD-10-CM

## 2017-10-09 PROCEDURE — 99024 POSTOP FOLLOW-UP VISIT: CPT | Performed by: SURGERY

## 2017-10-09 NOTE — PROGRESS NOTES
"Patient: Marni Hussein    YOB: 1960    Date: 10/09/2017    Primary Care Provider: Daniela Hoyos MD    Reason for Consultation: Follow-up laparoscopic appendectomy.    Chief Complaint:   Chief Complaint   Patient presents with   • Post-op     laparoscopic appendectomy.       History of present illness:  I saw the patient in the office today as a followup from their recent laparoscopic appendectomy which was done on 09/23/2017, pathology report showed marked acute transmural appendicitis and gross serosal defect.  They state that they have done well although she admits to \"soreness when moving around\" and are having no complaints.    Vital Signs:   Temp:  [98.6 °F (37 °C)] 98.6 °F (37 °C)  Heart Rate:  [76] 76  Resp:  [16] 16  BP: (102)/(64) 102/64    Physical Exam:   General Appearance:    Alert, cooperative, in no acute distress   Abdomen:     no masses, no organomegaly, soft non-tender, non-distended, no guarding, wounds are well healed     Assessment / Plan:    1. Postoperative visit        I did discuss the situation with the patient today in the office and they have done well from their recent laparoscopic appendectomy.  I have released the patient back to normal activity, they understand that they need to be careful about heavy lifting.  I need to see the patient back in the office only if they are having further problems, they know to call me if they are.    Electronically signed by Etienne Branham MD  10/09/17        Scribed for Etienne Branham MD by Diane Sanchez. 10/9/2017  9:23 AM            "

## 2017-10-27 ENCOUNTER — FLU SHOT (OUTPATIENT)
Dept: INTERNAL MEDICINE | Facility: CLINIC | Age: 57
End: 2017-10-27

## 2017-10-27 DIAGNOSIS — Z23 NEED FOR INFLUENZA VACCINE: ICD-10-CM

## 2017-10-27 PROCEDURE — 90686 IIV4 VACC NO PRSV 0.5 ML IM: CPT | Performed by: FAMILY MEDICINE

## 2017-10-27 PROCEDURE — 90471 IMMUNIZATION ADMIN: CPT | Performed by: FAMILY MEDICINE

## 2018-05-21 ENCOUNTER — OFFICE VISIT (OUTPATIENT)
Dept: INTERNAL MEDICINE | Facility: CLINIC | Age: 58
End: 2018-05-21

## 2018-05-21 VITALS
BODY MASS INDEX: 20.56 KG/M2 | OXYGEN SATURATION: 99 % | TEMPERATURE: 97.5 F | DIASTOLIC BLOOD PRESSURE: 70 MMHG | WEIGHT: 131 LBS | HEIGHT: 67 IN | HEART RATE: 62 BPM | SYSTOLIC BLOOD PRESSURE: 110 MMHG

## 2018-05-21 DIAGNOSIS — J32.9 VIRAL SINUSITIS: Primary | ICD-10-CM

## 2018-05-21 DIAGNOSIS — B97.89 VIRAL SINUSITIS: Primary | ICD-10-CM

## 2018-05-21 PROBLEM — K35.30 ACUTE APPENDICITIS WITH LOCALIZED PERITONITIS: Status: RESOLVED | Noted: 2017-09-23 | Resolved: 2018-05-21

## 2018-05-21 PROCEDURE — 99213 OFFICE O/P EST LOW 20 MIN: CPT | Performed by: FAMILY MEDICINE

## 2018-07-27 ENCOUNTER — TRANSCRIBE ORDERS (OUTPATIENT)
Dept: MAMMOGRAPHY | Facility: HOSPITAL | Age: 58
End: 2018-07-27

## 2018-07-27 DIAGNOSIS — Z12.31 VISIT FOR SCREENING MAMMOGRAM: Primary | ICD-10-CM

## 2018-08-13 ENCOUNTER — HOSPITAL ENCOUNTER (OUTPATIENT)
Dept: MAMMOGRAPHY | Facility: HOSPITAL | Age: 58
Discharge: HOME | End: 2018-08-13
Attending: OBSTETRICS & GYNECOLOGY | Admitting: OBSTETRICS & GYNECOLOGY

## 2018-08-13 DIAGNOSIS — Z12.31 VISIT FOR SCREENING MAMMOGRAM: ICD-10-CM

## 2018-08-13 PROCEDURE — 77063 BREAST TOMOSYNTHESIS BI: CPT | Performed by: RADIOLOGY

## 2018-08-13 PROCEDURE — 77063 BREAST TOMOSYNTHESIS BI: CPT

## 2018-08-13 PROCEDURE — 77067 SCR MAMMO BI INCL CAD: CPT

## 2018-08-13 PROCEDURE — 77067 SCR MAMMO BI INCL CAD: CPT | Performed by: RADIOLOGY

## 2018-09-06 ENCOUNTER — OFFICE VISIT (OUTPATIENT)
Dept: INTERNAL MEDICINE | Facility: CLINIC | Age: 58
End: 2018-09-06

## 2018-09-06 VITALS
OXYGEN SATURATION: 98 % | BODY MASS INDEX: 20.56 KG/M2 | TEMPERATURE: 97.9 F | SYSTOLIC BLOOD PRESSURE: 116 MMHG | HEART RATE: 62 BPM | WEIGHT: 131 LBS | DIASTOLIC BLOOD PRESSURE: 64 MMHG | HEIGHT: 67 IN

## 2018-09-06 DIAGNOSIS — Z00.00 PHYSICAL EXAM: ICD-10-CM

## 2018-09-06 DIAGNOSIS — E78.49 OTHER HYPERLIPIDEMIA: Primary | ICD-10-CM

## 2018-09-06 DIAGNOSIS — K21.00 GASTROESOPHAGEAL REFLUX DISEASE WITH ESOPHAGITIS: ICD-10-CM

## 2018-09-06 DIAGNOSIS — Z76.89 ENCOUNTER TO ESTABLISH CARE: ICD-10-CM

## 2018-09-06 DIAGNOSIS — M85.89 OSTEOPENIA OF MULTIPLE SITES: ICD-10-CM

## 2018-09-06 PROCEDURE — 99396 PREV VISIT EST AGE 40-64: CPT | Performed by: INTERNAL MEDICINE

## 2018-09-06 RX ORDER — RANITIDINE 150 MG/1
150 CAPSULE ORAL 2 TIMES DAILY
Qty: 60 CAPSULE | Refills: 11 | Status: SHIPPED | OUTPATIENT
Start: 2018-09-06 | End: 2019-09-06

## 2018-09-06 NOTE — PROGRESS NOTES
Chief Complaint   Patient presents with   • Annual Exam     to establish care with Dr. Vermeesch today. Pt states she's having issues with allergies, heartburn, and left shoulder.      Subjective   Marni Hussein is a 58 y.o. female.     Pt is here to be established.   PMH of HLD, GERD, OP and ileocecal valve syndrome.    PSH, SH, FH, meds and allergies reviewed.   HCM- mammogram is UTD. Colonoscopy in 2016. She sees Dr Lopez for her pap every 3 yrs.   HLD- she tries to eat a HH diet.  She tries to walk regularly for exercise.   GERD-  She takes TUMS prn for this.  Her sxs have been worse recently, has been eating more tomatoes.  She has used some OTC prilosec.   Allergies- she is taking allegra for her allergies.  She has not tried zyrtec.   OP- she tries to take her calcium and vit D most days.   Left shoulder pain on and off.  She has had pain in left arm for about 18 yrs since she pulled her neck and shoulder in the shower.  She had it evaluated.  She has PT on and off for this.  Has pain in trapezius area over left upper shoulder currently but does need feel need for PT.  Does not drop things with left hand.   She sees eye doctor annually and dentist twice a year.  Patient states that she wears her seatbelt and does not text and drive.         The following portions of the patient's history were reviewed and updated as appropriate: allergies, current medications, past family history, past medical history, past social history, past surgical history and problem list.    Review of Systems   Constitutional: Negative for unexpected weight change.   Respiratory: Negative for shortness of breath.    Cardiovascular: Negative for chest pain.   Gastrointestinal: Negative for blood in stool and nausea.        Occasional GERD symptoms   Musculoskeletal: Positive for myalgias.        Left shoulder and muscle pain   Allergic/Immunologic: Positive for environmental allergies.   Psychiatric/Behavioral: Negative for  "dysphoric mood.   All other systems reviewed and are negative.      Objective   /64   Pulse 62   Temp 97.9 °F (36.6 °C)   Ht 168.9 cm (66.5\")   Wt 59.4 kg (131 lb)   SpO2 98%   BMI 20.83 kg/m²   Body mass index is 20.83 kg/m².  Physical Exam   Constitutional: She is oriented to person, place, and time. She appears well-developed and well-nourished.   Pleasant female in no apparent distress   HENT:   Head: Normocephalic and atraumatic.   Right Ear: External ear normal.   Left Ear: External ear normal.   Mouth/Throat: Oropharynx is clear and moist.   Clear postnasal drip noted   Eyes: Pupils are equal, round, and reactive to light. Conjunctivae and EOM are normal.   Neck: Normal range of motion. Neck supple. No thyromegaly present.   Cardiovascular: Normal rate, regular rhythm, normal heart sounds and intact distal pulses.    No murmur heard.  Pulmonary/Chest: Effort normal and breath sounds normal. No respiratory distress.   Abdominal: Soft. Bowel sounds are normal.   Musculoskeletal: Normal range of motion. She exhibits no edema.   Lymphadenopathy:     She has no cervical adenopathy.   Neurological: She is alert and oriented to person, place, and time. No cranial nerve deficit.   Psychiatric: She has a normal mood and affect. Her behavior is normal. Judgment and thought content normal.   Nursing note and vitals reviewed.      Assessment/Plan   Marni Hussein is here today and the following problems have been addressed:      Marni was seen today for annual exam.    Diagnoses and all orders for this visit:    Other hyperlipidemia  -     Comprehensive Metabolic Panel  -     Lipid Panel With / Chol / HDL Ratio    Osteopenia of multiple sites  -     Vitamin D 25 Hydroxy    Gastroesophageal reflux disease with esophagitis  -     CBC & Differential    Encounter to establish care  -     CBC & Differential  -     Comprehensive Metabolic Panel  -     Lipid Panel With / Chol / HDL Ratio  -     Vitamin D 25 " Hydroxy    Physical exam    Other orders  -     ranitidine (ZANTAC) 150 MG capsule; Take 1 capsule by mouth 2 (Two) Times a Day.    Follow heart healthy/low cholesterol diet  Avoid processed/fried foods/fast food  Exercise as tolerated up to 30 minutes 5 days a week  Take all medications as prescribed  Labs as noted  Given zantac to use as needed for occasional GERD symptoms  Change to zyrtec 10 mg qhs for current allergy symptoms  Mammogram is up-to-date  Colonoscopy up-to-date    RTC one yr or prn    Please note that portions of this note were completed with a voice recognition program.  Efforts were made to edit dictation, but occasionally words are mistranscribed.

## 2018-09-12 LAB
25(OH)D3+25(OH)D2 SERPL-MCNC: 32 NG/ML
ALBUMIN SERPL-MCNC: 4.3 G/DL (ref 3.5–5)
ALBUMIN/GLOB SERPL: 1.7 G/DL (ref 1–2)
ALP SERPL-CCNC: 74 U/L (ref 38–126)
ALT SERPL-CCNC: 30 U/L (ref 13–69)
AST SERPL-CCNC: 25 U/L (ref 15–46)
BASOPHILS # BLD AUTO: 0.04 10*3/MM3 (ref 0–0.2)
BASOPHILS NFR BLD AUTO: 0.7 % (ref 0–2.5)
BILIRUB SERPL-MCNC: 0.6 MG/DL (ref 0.2–1.3)
BUN SERPL-MCNC: 13 MG/DL (ref 7–20)
BUN/CREAT SERPL: 16.3 (ref 7.1–23.5)
CALCIUM SERPL-MCNC: 10 MG/DL (ref 8.4–10.2)
CHLORIDE SERPL-SCNC: 101 MMOL/L (ref 98–107)
CHOLEST SERPL-MCNC: 277 MG/DL (ref 0–199)
CHOLEST/HDLC SERPL: 3.69 {RATIO}
CO2 SERPL-SCNC: 32 MMOL/L (ref 26–30)
CREAT SERPL-MCNC: 0.8 MG/DL (ref 0.6–1.3)
EOSINOPHIL # BLD AUTO: 0.06 10*3/MM3 (ref 0–0.7)
EOSINOPHIL NFR BLD AUTO: 1.1 % (ref 0–7)
ERYTHROCYTE [DISTWIDTH] IN BLOOD BY AUTOMATED COUNT: 12.5 % (ref 11.5–14.5)
GLOBULIN SER CALC-MCNC: 2.5 GM/DL
GLUCOSE SERPL-MCNC: 85 MG/DL (ref 74–98)
HCT VFR BLD AUTO: 40.4 % (ref 37–47)
HDLC SERPL-MCNC: 75 MG/DL (ref 40–60)
HGB BLD-MCNC: 13 G/DL (ref 12–16)
IMM GRANULOCYTES # BLD: 0.02 10*3/MM3 (ref 0–0.06)
IMM GRANULOCYTES NFR BLD: 0.4 % (ref 0–0.6)
LDLC SERPL CALC-MCNC: 171 MG/DL (ref 0–99)
LYMPHOCYTES # BLD AUTO: 2.75 10*3/MM3 (ref 0.6–3.4)
LYMPHOCYTES NFR BLD AUTO: 49.6 % (ref 10–50)
MCH RBC QN AUTO: 30.4 PG (ref 27–31)
MCHC RBC AUTO-ENTMCNC: 32.2 G/DL (ref 30–37)
MCV RBC AUTO: 94.4 FL (ref 81–99)
MONOCYTES # BLD AUTO: 0.36 10*3/MM3 (ref 0–0.9)
MONOCYTES NFR BLD AUTO: 6.5 % (ref 0–12)
NEUTROPHILS # BLD AUTO: 2.31 10*3/MM3 (ref 2–6.9)
NEUTROPHILS NFR BLD AUTO: 41.7 % (ref 37–80)
NRBC BLD AUTO-RTO: 0 /100 WBC (ref 0–0)
PLATELET # BLD AUTO: 273 10*3/MM3 (ref 130–400)
POTASSIUM SERPL-SCNC: 4.7 MMOL/L (ref 3.5–5.1)
PROT SERPL-MCNC: 6.8 G/DL (ref 6.3–8.2)
RBC # BLD AUTO: 4.28 10*6/MM3 (ref 4.2–5.4)
SODIUM SERPL-SCNC: 141 MMOL/L (ref 137–145)
TRIGL SERPL-MCNC: 155 MG/DL
VLDLC SERPL CALC-MCNC: 31 MG/DL
WBC # BLD AUTO: 5.54 10*3/MM3 (ref 4.8–10.8)

## 2018-11-01 PROBLEM — Z76.89 ENCOUNTER TO ESTABLISH CARE: Status: RESOLVED | Noted: 2018-09-06 | Resolved: 2018-11-01

## 2018-11-12 ENCOUNTER — TELEPHONE (OUTPATIENT)
Dept: INTERNAL MEDICINE | Facility: CLINIC | Age: 58
End: 2018-11-12

## 2018-11-12 PROBLEM — Z00.00 PHYSICAL EXAM: Status: ACTIVE | Noted: 2018-11-12

## 2018-12-14 ENCOUNTER — OFFICE VISIT (OUTPATIENT)
Dept: INTERNAL MEDICINE | Facility: CLINIC | Age: 58
End: 2018-12-14

## 2018-12-14 VITALS
OXYGEN SATURATION: 98 % | SYSTOLIC BLOOD PRESSURE: 118 MMHG | TEMPERATURE: 98.2 F | HEART RATE: 64 BPM | DIASTOLIC BLOOD PRESSURE: 66 MMHG

## 2018-12-14 DIAGNOSIS — J20.8 VIRAL BRONCHITIS: Primary | ICD-10-CM

## 2018-12-14 PROCEDURE — 99213 OFFICE O/P EST LOW 20 MIN: CPT | Performed by: INTERNAL MEDICINE

## 2018-12-14 RX ORDER — PREDNISONE 20 MG/1
20 TABLET ORAL DAILY
Qty: 3 TABLET | Refills: 0 | Status: SHIPPED | OUTPATIENT
Start: 2018-12-14 | End: 2019-10-01

## 2018-12-14 NOTE — PROGRESS NOTES
Chief Complaint   Patient presents with   • Abstract     Pt states she's been having alot of drainage, wheezing, chest tightness, and cough.     Subjective   Marni Hussein is a 58 y.o. female.     Her sxs are all worse in the morning.    She has same sxs at [facility] and did all OTC treatment, got better for a short time and current sxs all started again.   She did have her flu shot.       URI    This is a new problem. The current episode started 1 to 4 weeks ago. The problem has been waxing and waning. There has been no fever. Associated symptoms include chest pain, congestion, coughing, sneezing and wheezing. Pertinent negatives include no ear pain, nausea, plugged ear sensation, rhinorrhea, sore throat or vomiting. Associated symptoms comments: PND. Treatments tried: mucinex, allegra. The treatment provided mild relief.        The following portions of the patient's history were reviewed and updated as appropriate: allergies, current medications, past family history, past medical history, past social history, past surgical history and problem list.    Review of Systems   HENT: Positive for congestion and sneezing. Negative for ear pain, rhinorrhea and sore throat.    Respiratory: Positive for cough and wheezing.    Cardiovascular: Positive for chest pain.   Gastrointestinal: Negative for nausea and vomiting.       Objective   /66   Pulse 64   Temp 98.2 °F (36.8 °C)   SpO2 98%   There is no height or weight on file to calculate BMI.  Physical Exam   Constitutional: She is oriented to person, place, and time. She appears well-developed and well-nourished. No distress.   HENT:   Head: Normocephalic and atraumatic.   Right Ear: External ear normal.   Left Ear: External ear normal.   Mouth/Throat: Oropharynx is clear and moist.   Turbinates slightly erythematous with clear rhinorrhea and clear postnasal drip, no sinus tenderness, tympanic membranes normal   Eyes: Conjunctivae and EOM are normal. Pupils  are equal, round, and reactive to light.   Neck: Normal range of motion. Neck supple.   Cardiovascular: Normal rate, regular rhythm and normal heart sounds.   No murmur heard.  Pulmonary/Chest: Effort normal and breath sounds normal. No respiratory distress. She has no wheezes.   Musculoskeletal: Normal range of motion. She exhibits no edema.   Lymphadenopathy:     She has no cervical adenopathy.   Neurological: She is alert and oriented to person, place, and time.   Psychiatric: She has a normal mood and affect. Her behavior is normal. Judgment and thought content normal.   Nursing note and vitals reviewed.      Assessment/Plan   Marni Hussein is here today and the following problems have been addressed:      Marni was seen today for abstract.    Diagnoses and all orders for this visit:    Viral bronchitis    Other orders  -     predniSONE (DELTASONE) 20 MG tablet; Take 1 tablet by mouth Daily.    her exam is benign but she feels tight today  Given prednisone 20 mg q day for 3 days  Recommend plain mucinex   Increase fluids and rest    RTC prn if sxs worsen or persist    Please note that portions of this note were completed with a voice recognition program.  Efforts were made to edit dictation, but occasionally words are mistranscribed.

## 2019-08-09 ENCOUNTER — TRANSCRIBE ORDERS (OUTPATIENT)
Dept: ADMINISTRATIVE | Facility: HOSPITAL | Age: 59
End: 2019-08-09

## 2019-08-09 DIAGNOSIS — Z12.31 VISIT FOR SCREENING MAMMOGRAM: Primary | ICD-10-CM

## 2019-08-27 ENCOUNTER — HOSPITAL ENCOUNTER (OUTPATIENT)
Dept: MAMMOGRAPHY | Facility: HOSPITAL | Age: 59
Discharge: HOME | End: 2019-08-27
Admitting: OBSTETRICS & GYNECOLOGY

## 2019-08-27 DIAGNOSIS — Z12.31 VISIT FOR SCREENING MAMMOGRAM: ICD-10-CM

## 2019-08-27 PROCEDURE — 77063 BREAST TOMOSYNTHESIS BI: CPT | Performed by: RADIOLOGY

## 2019-08-27 PROCEDURE — 77067 SCR MAMMO BI INCL CAD: CPT

## 2019-08-27 PROCEDURE — 77067 SCR MAMMO BI INCL CAD: CPT | Performed by: RADIOLOGY

## 2019-08-27 PROCEDURE — 77063 BREAST TOMOSYNTHESIS BI: CPT

## 2019-10-01 ENCOUNTER — OFFICE VISIT (OUTPATIENT)
Dept: INTERNAL MEDICINE | Facility: CLINIC | Age: 59
End: 2019-10-01

## 2019-10-01 VITALS
OXYGEN SATURATION: 98 % | BODY MASS INDEX: 21.03 KG/M2 | TEMPERATURE: 97.7 F | DIASTOLIC BLOOD PRESSURE: 66 MMHG | HEIGHT: 67 IN | HEART RATE: 68 BPM | WEIGHT: 134 LBS | SYSTOLIC BLOOD PRESSURE: 120 MMHG

## 2019-10-01 DIAGNOSIS — K21.00 GASTROESOPHAGEAL REFLUX DISEASE WITH ESOPHAGITIS: ICD-10-CM

## 2019-10-01 DIAGNOSIS — Z00.00 PHYSICAL EXAM: Primary | ICD-10-CM

## 2019-10-01 DIAGNOSIS — M85.89 OSTEOPENIA OF MULTIPLE SITES: ICD-10-CM

## 2019-10-01 DIAGNOSIS — E78.49 OTHER HYPERLIPIDEMIA: ICD-10-CM

## 2019-10-01 PROBLEM — J20.8 VIRAL BRONCHITIS: Status: RESOLVED | Noted: 2018-12-14 | Resolved: 2019-10-01

## 2019-10-01 PROCEDURE — 90674 CCIIV4 VAC NO PRSV 0.5 ML IM: CPT | Performed by: INTERNAL MEDICINE

## 2019-10-01 PROCEDURE — 90472 IMMUNIZATION ADMIN EACH ADD: CPT | Performed by: INTERNAL MEDICINE

## 2019-10-01 PROCEDURE — 90471 IMMUNIZATION ADMIN: CPT | Performed by: INTERNAL MEDICINE

## 2019-10-01 PROCEDURE — 99396 PREV VISIT EST AGE 40-64: CPT | Performed by: INTERNAL MEDICINE

## 2019-10-01 PROCEDURE — 90632 HEPA VACCINE ADULT IM: CPT | Performed by: INTERNAL MEDICINE

## 2019-10-01 RX ORDER — RANITIDINE 150 MG/1
150 TABLET ORAL 2 TIMES DAILY PRN
Refills: 11 | COMMUNITY
Start: 2019-09-13 | End: 2019-10-01

## 2019-10-01 RX ORDER — FAMOTIDINE 40 MG/1
40 TABLET, FILM COATED ORAL NIGHTLY PRN
Qty: 90 TABLET | Refills: 3 | Status: SHIPPED | OUTPATIENT
Start: 2019-10-01 | End: 2020-11-12

## 2019-10-01 RX ORDER — MONTELUKAST SODIUM 10 MG/1
10 TABLET ORAL DAILY
Refills: 11 | COMMUNITY
Start: 2019-09-13 | End: 2020-10-02

## 2019-10-01 RX ORDER — LEVOCETIRIZINE DIHYDROCHLORIDE 5 MG/1
5 TABLET, FILM COATED ORAL DAILY
Refills: 11 | COMMUNITY
Start: 2019-09-13 | End: 2020-10-02

## 2019-10-01 RX ORDER — IPRATROPIUM BROMIDE 21 UG/1
SPRAY, METERED NASAL
Refills: 11 | COMMUNITY
Start: 2019-07-03 | End: 2021-10-04 | Stop reason: SDUPTHER

## 2019-10-01 NOTE — PROGRESS NOTES
"Chief Complaint   Patient presents with   • Annual Exam     Physical only.      Subjective   Marni Hussein is a 59 y.o. female.     Pt is here for annual PE today.  PMH of HLD, GERD, OP, allergies and ileocecal valve syndrome.   HCM- mammogram is UTD.  Pap 2018 per Dr Lopez.  Colonoscopy 2016.  Has had Hep a #1.    HLD-  She tries to eat a HH diet.  She does not exercise much at all.    Allergies- she was seen by allergist in the spring and was started on xyzal, singulair and prn albuterol.  She is currently taking just the xyzal.   She had allergy testing, does not need shots at this time.    OP- she does not take her vit D and calcium daily.    GERD/ileocecal valve syndrome- she has some mild constipation.  She uses benefiber most days.  She has GERD when she eats spicy foods, she takes zantac once a day  She has been having some burning on urination for past several days.  No F/C or back pain.    She sees the dentist twice a yr and eye doctor every other yr.    She wears her seat belt.  Does not text and drive.          The following portions of the patient's history were reviewed and updated as appropriate: allergies, current medications, past family history, past medical history, past social history, past surgical history and problem list.    Review of Systems   Constitutional: Negative.    HENT: Negative.    Eyes: Positive for itching.   Respiratory: Negative.    Cardiovascular: Negative.    Gastrointestinal: Positive for constipation.        Occasional GERD symptoms   Endocrine: Negative.    Genitourinary: Positive for dysuria.   Musculoskeletal: Negative.    Skin: Negative.    Allergic/Immunologic: Positive for environmental allergies.   Neurological: Negative.    Hematological: Negative.    Psychiatric/Behavioral: Negative.        Objective   /66   Pulse 68   Temp 97.7 °F (36.5 °C)   Ht 168.9 cm (66.5\")   Wt 60.8 kg (134 lb)   SpO2 98%   BMI 21.30 kg/m²   Body mass index is 21.3 " kg/m².  Physical Exam   Constitutional: She is oriented to person, place, and time. She appears well-developed and well-nourished. No distress.   Very pleasant female who appears her stated age, she is in no distress today   HENT:   Head: Normocephalic and atraumatic.   Right Ear: External ear normal.   Left Ear: External ear normal.   Mouth/Throat: Oropharynx is clear and moist.   Tympanic membranes normal, scant amount of clear postnasal drip noted   Eyes: Conjunctivae and EOM are normal. Pupils are equal, round, and reactive to light.   Neck: Normal range of motion. Neck supple. No thyromegaly present.   Cardiovascular: Normal rate, regular rhythm, normal heart sounds and intact distal pulses.   No murmur heard.  No carotid bruits   Pulmonary/Chest: Effort normal and breath sounds normal. No respiratory distress. She has no wheezes.   Abdominal: Soft. Bowel sounds are normal. She exhibits no distension. There is no tenderness.   Musculoskeletal: Normal range of motion. She exhibits no edema.   Lymphadenopathy:     She has no cervical adenopathy.   Neurological: She is alert and oriented to person, place, and time. She displays normal reflexes. No cranial nerve deficit. Coordination normal.   Skin: No rash noted.   Psychiatric: She has a normal mood and affect. Her behavior is normal. Judgment and thought content normal.   Nursing note and vitals reviewed.      Assessment/Plan   Marni Hussein is here today and the following problems have been addressed:      Marni was seen today for annual exam.    Diagnoses and all orders for this visit:    Physical exam  -     CBC & Differential  -     Comprehensive Metabolic Panel  -     Lipid Panel With / Chol / HDL Ratio  -     Vitamin D 25 Hydroxy    Other hyperlipidemia  -     Comprehensive Metabolic Panel  -     Lipid Panel With / Chol / HDL Ratio    Gastroesophageal reflux disease with esophagitis  -     CBC & Differential    Osteopenia of multiple sites  -      Vitamin D 25 Hydroxy    Other orders  -     famotidine (PEPCID) 40 MG tablet; Take 1 tablet by mouth At Night As Needed for Heartburn.    Labs as noted  Recommend avoidance of processed foods  Exercise for 30 minutes 5 days a week as tolerated  Recommend annual eye doctor appointment, or as necessary  See your dentist twice a year.  Recommend that you brush teeth twice daily and floss minimum of once daily  Recommend regular seatbelt use  Do not text or use phone while driving  She was given her flu and Hep A #2 today  Mammogram and colonoscopy are UTD  Stop zantac and change to pepcid 40 mg qhs  Take citracal daily with vit D 2000 u a day      RTC one yr for physical    Please note that portions of this note were completed with a voice recognition program.  Efforts were made to edit dictation, but occasionally words are mistranscribed.

## 2019-10-02 ENCOUNTER — CLINICAL SUPPORT (OUTPATIENT)
Dept: INTERNAL MEDICINE | Facility: CLINIC | Age: 59
End: 2019-10-02

## 2019-10-02 DIAGNOSIS — Z00.00 PHYSICAL EXAM: Primary | ICD-10-CM

## 2019-10-02 LAB
25(OH)D3+25(OH)D2 SERPL-MCNC: 29.1 NG/ML (ref 30–100)
ALBUMIN SERPL-MCNC: 4.5 G/DL (ref 3.5–5.2)
ALBUMIN/GLOB SERPL: 1.8 G/DL
ALP SERPL-CCNC: 90 U/L (ref 39–117)
ALT SERPL-CCNC: 23 U/L (ref 1–33)
AST SERPL-CCNC: 26 U/L (ref 1–32)
BASOPHILS # BLD AUTO: 0.03 10*3/MM3 (ref 0–0.2)
BASOPHILS NFR BLD AUTO: 0.5 % (ref 0–1.5)
BILIRUB BLD-MCNC: NEGATIVE MG/DL
BILIRUB SERPL-MCNC: 0.4 MG/DL (ref 0.2–1.2)
BUN SERPL-MCNC: 11 MG/DL (ref 6–20)
BUN/CREAT SERPL: 14.5 (ref 7–25)
CALCIUM SERPL-MCNC: 9.7 MG/DL (ref 8.6–10.5)
CHLORIDE SERPL-SCNC: 99 MMOL/L (ref 98–107)
CHOLEST SERPL-MCNC: 274 MG/DL (ref 0–200)
CHOLEST/HDLC SERPL: 3.81 {RATIO}
CLARITY, POC: CLEAR
CO2 SERPL-SCNC: 28.8 MMOL/L (ref 22–29)
COLOR UR: YELLOW
CREAT SERPL-MCNC: 0.76 MG/DL (ref 0.57–1)
EOSINOPHIL # BLD AUTO: 0.06 10*3/MM3 (ref 0–0.4)
EOSINOPHIL NFR BLD AUTO: 1 % (ref 0.3–6.2)
ERYTHROCYTE [DISTWIDTH] IN BLOOD BY AUTOMATED COUNT: 12.5 % (ref 12.3–15.4)
GLOBULIN SER CALC-MCNC: 2.5 GM/DL
GLUCOSE SERPL-MCNC: 87 MG/DL (ref 65–99)
GLUCOSE UR STRIP-MCNC: NEGATIVE MG/DL
HCT VFR BLD AUTO: 41.1 % (ref 34–46.6)
HDLC SERPL-MCNC: 72 MG/DL (ref 40–60)
HGB BLD-MCNC: 13.7 G/DL (ref 12–15.9)
IMM GRANULOCYTES # BLD AUTO: 0.01 10*3/MM3 (ref 0–0.05)
IMM GRANULOCYTES NFR BLD AUTO: 0.2 % (ref 0–0.5)
KETONES UR QL: NEGATIVE
LDLC SERPL CALC-MCNC: 177 MG/DL (ref 0–100)
LEUKOCYTE EST, POC: NEGATIVE
LYMPHOCYTES # BLD AUTO: 2.73 10*3/MM3 (ref 0.7–3.1)
LYMPHOCYTES NFR BLD AUTO: 45.7 % (ref 19.6–45.3)
MCH RBC QN AUTO: 29.9 PG (ref 26.6–33)
MCHC RBC AUTO-ENTMCNC: 33.3 G/DL (ref 31.5–35.7)
MCV RBC AUTO: 89.7 FL (ref 79–97)
MONOCYTES # BLD AUTO: 0.51 10*3/MM3 (ref 0.1–0.9)
MONOCYTES NFR BLD AUTO: 8.5 % (ref 5–12)
NEUTROPHILS # BLD AUTO: 2.63 10*3/MM3 (ref 1.7–7)
NEUTROPHILS NFR BLD AUTO: 44.1 % (ref 42.7–76)
NITRITE UR-MCNC: NEGATIVE MG/ML
NRBC BLD AUTO-RTO: 0 /100 WBC (ref 0–0.2)
PH UR: 7.5 [PH] (ref 5–8)
PLATELET # BLD AUTO: 285 10*3/MM3 (ref 140–450)
POTASSIUM SERPL-SCNC: 4.6 MMOL/L (ref 3.5–5.2)
PROT SERPL-MCNC: 7 G/DL (ref 6–8.5)
PROT UR STRIP-MCNC: NEGATIVE MG/DL
RBC # BLD AUTO: 4.58 10*6/MM3 (ref 3.77–5.28)
RBC # UR STRIP: NEGATIVE /UL
SODIUM SERPL-SCNC: 138 MMOL/L (ref 136–145)
SP GR UR: 1.01 (ref 1–1.03)
TRIGL SERPL-MCNC: 124 MG/DL (ref 0–150)
UROBILINOGEN UR QL: NORMAL
VLDLC SERPL CALC-MCNC: 24.8 MG/DL
WBC # BLD AUTO: 5.97 10*3/MM3 (ref 3.4–10.8)

## 2019-10-02 PROCEDURE — 81003 URINALYSIS AUTO W/O SCOPE: CPT | Performed by: INTERNAL MEDICINE

## 2019-10-03 NOTE — PROGRESS NOTES
Please call patient with lab results.  Complete blood count, kidney and liver function are normal.  Vitamin D level is borderline low, for which I recommend she increase current dose by 1000 units more daily.  Her cholesterol panel remains quite elevated with total cholesterol of 274 and LDL or bad cholesterol of 177, this should be less than 100.  Her good cholesterol is elevated at 72.  I am concerned that she is at risk for heart disease or stroke due to her elevated LDL or bad cholesterol and high total cholesterol levels.  She admits that she does not exercise.  If she is agreeable I recommend a low dose of Crestor generic 10 mg daily number 30 tablets with 11 refills.  I also recommend she take co-Q10 100 mg daily with this.  I also feel it is important for her to try to exercise or walk 30 minutes 5 days a week.

## 2020-10-02 ENCOUNTER — OFFICE VISIT (OUTPATIENT)
Dept: INTERNAL MEDICINE | Facility: CLINIC | Age: 60
End: 2020-10-02

## 2020-10-02 VITALS
WEIGHT: 130 LBS | DIASTOLIC BLOOD PRESSURE: 70 MMHG | HEIGHT: 67 IN | OXYGEN SATURATION: 98 % | BODY MASS INDEX: 20.4 KG/M2 | HEART RATE: 71 BPM | SYSTOLIC BLOOD PRESSURE: 124 MMHG | TEMPERATURE: 97.3 F

## 2020-10-02 DIAGNOSIS — Z12.31 SCREENING MAMMOGRAM, ENCOUNTER FOR: ICD-10-CM

## 2020-10-02 DIAGNOSIS — Z00.00 PHYSICAL EXAM: Primary | ICD-10-CM

## 2020-10-02 DIAGNOSIS — M85.89 OSTEOPENIA OF MULTIPLE SITES: ICD-10-CM

## 2020-10-02 DIAGNOSIS — K21.00 GASTROESOPHAGEAL REFLUX DISEASE WITH ESOPHAGITIS, UNSPECIFIED WHETHER HEMORRHAGE: ICD-10-CM

## 2020-10-02 DIAGNOSIS — E78.49 OTHER HYPERLIPIDEMIA: ICD-10-CM

## 2020-10-02 PROBLEM — K63.89: Status: RESOLVED | Noted: 2017-02-23 | Resolved: 2020-10-02

## 2020-10-02 PROCEDURE — 99396 PREV VISIT EST AGE 40-64: CPT | Performed by: INTERNAL MEDICINE

## 2020-10-02 NOTE — PROGRESS NOTES
Chief Complaint   Patient presents with   • Annual Exam     Physical only.      Subjective   Marni Hussein is a 60 y.o. female.     Here today for annual PE.   PMH of HLD, GERD, OP, allergies.    HCM- colonoscopy 2016, due 2026.  Mammogram due now.  Pap 2018-she will schedule with her GYN for this in [place].  Has had Hep A vaccine.  She had her flu shot in early September.    HLD-  She is walking 3-4 days a week.  She avoids fast/fatty foods, eats more red meat and pork than she used to.    GERD- does have some GERD, takes pepcid regularly.  She has been eating a lot of tomatoes  OP-  She is taking her TUMS and vit D regularly.    Allergies-  She is taking allegra for allergies and it is working well for her.    She has been having some anxiety lately.  She is also feeling some SOA when she goes up steps and feels breathless for a few seconds at times also.  She says her heart feels odd when this occurs.  These episodes pass very quickly.  She is sleeping well overall.    She sees the eye doctor annually and dentist twice a yr.   She wears her seatbelt and does not text and drive.         The following portions of the patient's history were reviewed and updated as appropriate: allergies, current medications, past family history, past medical history, past social history, past surgical history and problem list.    Review of Systems   Constitutional: Negative for activity change, appetite change and unexpected weight change.   Eyes: Negative for visual disturbance.   Respiratory: Positive for shortness of breath.    Cardiovascular: Negative for chest pain, palpitations and leg swelling.   Gastrointestinal: Negative for abdominal pain.        Occasional GERD symptoms   Allergic/Immunologic: Positive for environmental allergies.   Neurological: Negative for headaches.   Psychiatric/Behavioral: The patient is nervous/anxious.    All other systems reviewed and are negative.      Objective   /70   Pulse 71    "Temp 97.3 °F (36.3 °C)   Ht 168.9 cm (66.5\")   Wt 59 kg (130 lb)   SpO2 98%   BMI 20.67 kg/m²   Body mass index is 20.67 kg/m².  Physical Exam  Vitals signs and nursing note reviewed.   Constitutional:       General: She is not in acute distress.     Appearance: Normal appearance. She is well-developed and normal weight. She is not ill-appearing.      Comments: Very kind and pleasant female, appears stated age, she is in no distress today, wearing a mask   HENT:      Head: Normocephalic and atraumatic.      Right Ear: Tympanic membrane, ear canal and external ear normal.      Left Ear: Tympanic membrane, ear canal and external ear normal.   Eyes:      General:         Right eye: No discharge.         Left eye: No discharge.      Extraocular Movements: Extraocular movements intact.      Conjunctiva/sclera: Conjunctivae normal.      Pupils: Pupils are equal, round, and reactive to light.   Neck:      Musculoskeletal: Normal range of motion and neck supple.      Thyroid: No thyromegaly.   Cardiovascular:      Rate and Rhythm: Normal rate and regular rhythm.      Pulses: Normal pulses.      Heart sounds: Normal heart sounds. No murmur.      Comments: No carotid bruits  Pulmonary:      Effort: Pulmonary effort is normal. No respiratory distress.      Breath sounds: Normal breath sounds. No wheezing.   Abdominal:      General: Bowel sounds are normal. There is no distension.      Palpations: Abdomen is soft.      Tenderness: There is no abdominal tenderness.   Musculoskeletal: Normal range of motion.      Right lower leg: No edema.      Left lower leg: No edema.   Lymphadenopathy:      Cervical: No cervical adenopathy.   Skin:     General: Skin is warm.      Findings: No rash.   Neurological:      General: No focal deficit present.      Mental Status: She is alert and oriented to person, place, and time.      Cranial Nerves: No cranial nerve deficit.      Coordination: Coordination normal.   Psychiatric:         Mood " and Affect: Mood normal.         Behavior: Behavior normal.         Thought Content: Thought content normal.         Judgment: Judgment normal.         Assessment/Plan   Marni Hussein is here today and the following problems have been addressed:      Marni was seen today for annual exam.    Diagnoses and all orders for this visit:    Physical exam  -     CBC & Differential  -     Comprehensive Metabolic Panel  -     Lipid Panel With / Chol / HDL Ratio    Other hyperlipidemia  -     Comprehensive Metabolic Panel  -     Lipid Panel With / Chol / HDL Ratio    Gastroesophageal reflux disease with esophagitis, unspecified whether hemorrhage  -     CBC & Differential    Osteopenia of multiple sites    Screening mammogram, encounter for  -     Mammo Screening Digital Tomosynthesis Bilateral With CAD; Future    Labs as noted  Recommend avoidance of processed foods  Exercise for 30 minutes 5 days a week as tolerated  Recommend annual eye doctor appointment, or as necessary  See your dentist twice a year.  Recommend that you brush teeth twice daily and floss minimum of once daily  Recommend regular seatbelt use  Do not text or use phone while driving  Mammogram ordered  Continue Pepcid for current GERD symptoms  Continue Allegra for allergies, patient has not required any inhalers recently  Continue Tums and vitamin D for underlying osteopenia  Patient will schedule Pap with local GYN  She has had her annual flu vaccine, she will consider shingles vaccine at local pharmacy  We discussed her symptoms of shortness of breath and intermittent anxiety.  We decided against doing EKG as she is currently asymptomatic and her symptoms last only for seconds.  She is likely having anxiety or panic symptoms which are common in her family and at this time we elected against medications.  She was encouraged to return to clinic if symptoms worsen or persist    Return to clinic in 1 year or as needed    Please note that portions of this  note were completed with a voice recognition program.  Efforts were made to edit dictation, but occasionally words are mistranscribed.

## 2020-10-08 LAB
ALBUMIN SERPL-MCNC: 4.6 G/DL (ref 3.5–5.2)
ALBUMIN/GLOB SERPL: 2.2 G/DL
ALP SERPL-CCNC: 81 U/L (ref 39–117)
ALT SERPL-CCNC: 15 U/L (ref 1–33)
AST SERPL-CCNC: 17 U/L (ref 1–32)
BASOPHILS # BLD AUTO: 0.04 10*3/MM3 (ref 0–0.2)
BASOPHILS NFR BLD AUTO: 0.7 % (ref 0–1.5)
BILIRUB SERPL-MCNC: 0.5 MG/DL (ref 0–1.2)
BUN SERPL-MCNC: 10 MG/DL (ref 8–23)
BUN/CREAT SERPL: 12.7 (ref 7–25)
CALCIUM SERPL-MCNC: 9.7 MG/DL (ref 8.6–10.5)
CHLORIDE SERPL-SCNC: 102 MMOL/L (ref 98–107)
CHOLEST SERPL-MCNC: 257 MG/DL (ref 0–200)
CHOLEST/HDLC SERPL: 3.62 {RATIO}
CO2 SERPL-SCNC: 29.9 MMOL/L (ref 22–29)
CREAT SERPL-MCNC: 0.79 MG/DL (ref 0.57–1)
EOSINOPHIL # BLD AUTO: 0.07 10*3/MM3 (ref 0–0.4)
EOSINOPHIL NFR BLD AUTO: 1.3 % (ref 0.3–6.2)
ERYTHROCYTE [DISTWIDTH] IN BLOOD BY AUTOMATED COUNT: 12 % (ref 12.3–15.4)
GLOBULIN SER CALC-MCNC: 2.1 GM/DL
GLUCOSE SERPL-MCNC: 94 MG/DL (ref 65–99)
HCT VFR BLD AUTO: 39.8 % (ref 34–46.6)
HDLC SERPL-MCNC: 71 MG/DL (ref 40–60)
HGB BLD-MCNC: 13.6 G/DL (ref 12–15.9)
IMM GRANULOCYTES # BLD AUTO: 0.01 10*3/MM3 (ref 0–0.05)
IMM GRANULOCYTES NFR BLD AUTO: 0.2 % (ref 0–0.5)
LDLC SERPL CALC-MCNC: 157 MG/DL (ref 0–100)
LYMPHOCYTES # BLD AUTO: 2.7 10*3/MM3 (ref 0.7–3.1)
LYMPHOCYTES NFR BLD AUTO: 50.2 % (ref 19.6–45.3)
MCH RBC QN AUTO: 30.3 PG (ref 26.6–33)
MCHC RBC AUTO-ENTMCNC: 34.2 G/DL (ref 31.5–35.7)
MCV RBC AUTO: 88.6 FL (ref 79–97)
MONOCYTES # BLD AUTO: 0.42 10*3/MM3 (ref 0.1–0.9)
MONOCYTES NFR BLD AUTO: 7.8 % (ref 5–12)
NEUTROPHILS # BLD AUTO: 2.14 10*3/MM3 (ref 1.7–7)
NEUTROPHILS NFR BLD AUTO: 39.8 % (ref 42.7–76)
NRBC BLD AUTO-RTO: 0 /100 WBC (ref 0–0.2)
PLATELET # BLD AUTO: 273 10*3/MM3 (ref 140–450)
POTASSIUM SERPL-SCNC: 4.4 MMOL/L (ref 3.5–5.2)
PROT SERPL-MCNC: 6.7 G/DL (ref 6–8.5)
RBC # BLD AUTO: 4.49 10*6/MM3 (ref 3.77–5.28)
SODIUM SERPL-SCNC: 139 MMOL/L (ref 136–145)
TRIGL SERPL-MCNC: 161 MG/DL (ref 0–150)
VLDLC SERPL CALC-MCNC: 29 MG/DL (ref 5–40)
WBC # BLD AUTO: 5.38 10*3/MM3 (ref 3.4–10.8)

## 2020-10-15 ENCOUNTER — HOSPITAL ENCOUNTER (OUTPATIENT)
Dept: MAMMOGRAPHY | Facility: HOSPITAL | Age: 60
Discharge: HOME | End: 2020-10-15
Admitting: INTERNAL MEDICINE

## 2020-10-15 DIAGNOSIS — Z12.31 SCREENING MAMMOGRAM, ENCOUNTER FOR: ICD-10-CM

## 2020-10-15 PROCEDURE — 77063 BREAST TOMOSYNTHESIS BI: CPT | Performed by: RADIOLOGY

## 2020-10-15 PROCEDURE — 77067 SCR MAMMO BI INCL CAD: CPT | Performed by: RADIOLOGY

## 2020-10-15 PROCEDURE — 77067 SCR MAMMO BI INCL CAD: CPT

## 2020-10-15 PROCEDURE — 77063 BREAST TOMOSYNTHESIS BI: CPT

## 2020-11-12 RX ORDER — FAMOTIDINE 40 MG/1
TABLET, FILM COATED ORAL
Qty: 90 TABLET | Refills: 3 | Status: SHIPPED | OUTPATIENT
Start: 2020-11-12 | End: 2021-10-04 | Stop reason: SDUPTHER

## 2020-12-03 ENCOUNTER — PROCEDURE VISIT (OUTPATIENT)
Dept: OBSTETRICS AND GYNECOLOGY | Facility: CLINIC | Age: 60
End: 2020-12-03

## 2020-12-03 VITALS
HEIGHT: 67 IN | BODY MASS INDEX: 20.88 KG/M2 | DIASTOLIC BLOOD PRESSURE: 64 MMHG | WEIGHT: 133 LBS | SYSTOLIC BLOOD PRESSURE: 122 MMHG

## 2020-12-03 DIAGNOSIS — R39.198 DIFFICULTY VOIDING: ICD-10-CM

## 2020-12-03 DIAGNOSIS — N95.2 POSTMENOPAUSAL ATROPHIC VAGINITIS: ICD-10-CM

## 2020-12-03 DIAGNOSIS — N94.10 DYSPAREUNIA IN FEMALE: ICD-10-CM

## 2020-12-03 DIAGNOSIS — R30.9 PAIN WITH URINATION: ICD-10-CM

## 2020-12-03 DIAGNOSIS — Z01.419 ENCOUNTER FOR GYNECOLOGICAL EXAMINATION (GENERAL) (ROUTINE) WITHOUT ABNORMAL FINDINGS: Primary | ICD-10-CM

## 2020-12-03 DIAGNOSIS — Z12.31 ENCOUNTER FOR SCREENING MAMMOGRAM FOR BREAST CANCER: ICD-10-CM

## 2020-12-03 PROCEDURE — 99386 PREV VISIT NEW AGE 40-64: CPT | Performed by: OBSTETRICS & GYNECOLOGY

## 2020-12-03 PROCEDURE — 99213 OFFICE O/P EST LOW 20 MIN: CPT | Performed by: OBSTETRICS & GYNECOLOGY

## 2020-12-03 RX ORDER — ESTRADIOL 0.1 MG/G
CREAM VAGINAL
Qty: 1 EACH | Refills: 11 | Status: SHIPPED | OUTPATIENT
Start: 2020-12-03 | End: 2020-12-10 | Stop reason: SDUPTHER
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
## 2020-12-10 RX ORDER — ESTRADIOL 0.1 MG/G
CREAM VAGINAL
Qty: 1 EACH | Refills: 11 | Status: SHIPPED | OUTPATIENT
Start: 2020-12-10 | End: 2021-10-04

## 2020-12-30 ENCOUNTER — TELEPHONE (OUTPATIENT)
Dept: INTERNAL MEDICINE | Facility: CLINIC | Age: 60
End: 2020-12-30

## 2020-12-30 NOTE — TELEPHONE ENCOUNTER
Caller: Marni Hussein    Relationship: Self    Best call back number: [phone]    What test was performed: Cholesterol, Triglycerides, fasting glucose      When was the test performed: October 2020    Where was the test performed: In office     Additional notes: Patient would like to know what these levels were. She states she is not able to see the levels in mychart.

## 2021-08-26 ENCOUNTER — TRANSCRIBE ORDERS (OUTPATIENT)
Dept: ADMINISTRATIVE | Facility: HOSPITAL | Age: 61
End: 2021-08-26

## 2021-08-26 DIAGNOSIS — Z12.31 VISIT FOR SCREENING MAMMOGRAM: Primary | ICD-10-CM

## 2021-09-30 ENCOUNTER — LAB (OUTPATIENT)
Dept: LAB | Facility: HOSPITAL | Age: 61
End: 2021-09-30

## 2021-09-30 ENCOUNTER — TRANSCRIBE ORDERS (OUTPATIENT)
Dept: LAB | Facility: HOSPITAL | Age: 61
End: 2021-09-30

## 2021-09-30 DIAGNOSIS — Z20.822 COVID-19 RULED OUT: Primary | ICD-10-CM

## 2021-09-30 DIAGNOSIS — Z20.822 COVID-19 RULED OUT: ICD-10-CM

## 2021-09-30 PROCEDURE — U0004 COV-19 TEST NON-CDC HGH THRU: HCPCS

## 2021-10-01 LAB — SARS-COV-2 RNA NOSE QL NAA+PROBE: NOT DETECTED

## 2021-10-04 ENCOUNTER — OFFICE VISIT (OUTPATIENT)
Dept: INTERNAL MEDICINE | Facility: CLINIC | Age: 61
End: 2021-10-04

## 2021-10-04 VITALS
OXYGEN SATURATION: 98 % | HEART RATE: 75 BPM | BODY MASS INDEX: 20.88 KG/M2 | SYSTOLIC BLOOD PRESSURE: 120 MMHG | HEIGHT: 67 IN | WEIGHT: 133 LBS | TEMPERATURE: 97 F | DIASTOLIC BLOOD PRESSURE: 80 MMHG

## 2021-10-04 DIAGNOSIS — M25.512 ACUTE PAIN OF LEFT SHOULDER: ICD-10-CM

## 2021-10-04 DIAGNOSIS — E78.49 OTHER HYPERLIPIDEMIA: ICD-10-CM

## 2021-10-04 DIAGNOSIS — Z00.00 PHYSICAL EXAM: Primary | ICD-10-CM

## 2021-10-04 DIAGNOSIS — K21.00 GASTROESOPHAGEAL REFLUX DISEASE WITH ESOPHAGITIS, UNSPECIFIED WHETHER HEMORRHAGE: ICD-10-CM

## 2021-10-04 DIAGNOSIS — M85.89 OSTEOPENIA OF MULTIPLE SITES: ICD-10-CM

## 2021-10-04 PROCEDURE — 99396 PREV VISIT EST AGE 40-64: CPT | Performed by: INTERNAL MEDICINE

## 2021-10-04 PROCEDURE — 90686 IIV4 VACC NO PRSV 0.5 ML IM: CPT | Performed by: INTERNAL MEDICINE

## 2021-10-04 PROCEDURE — 90471 IMMUNIZATION ADMIN: CPT | Performed by: INTERNAL MEDICINE

## 2021-10-04 RX ORDER — FAMOTIDINE 40 MG/1
40 TABLET, FILM COATED ORAL NIGHTLY PRN
Qty: 90 TABLET | Refills: 3 | Status: SHIPPED | OUTPATIENT
Start: 2021-10-04 | End: 2022-10-22

## 2021-10-04 RX ORDER — IPRATROPIUM BROMIDE 21 UG/1
2 SPRAY, METERED NASAL 3 TIMES DAILY PRN
Qty: 30 ML | Refills: 1 | Status: SHIPPED | OUTPATIENT
Start: 2021-10-04

## 2021-10-04 NOTE — PROGRESS NOTES
Chief Complaint   Patient presents with   • Annual Exam     Physical only.      Subjective   Marni Hussein is a 61 y.o. female.     Here today for annual PE.   PMH of HLD, GERD, OP, allergies.    HCM- colonoscopy 2016, due 2026.  Mammogram due this month.  Pap 2020.  Has had Hep A vaccine.  She had her COVID vaccine also.    HLD-  She is eating a HH diet.  She is walking for exercise 2-3 times a week for about 40 minutes.    GERD- does have some GERD, takes pepcid regularly.  She has been eating a lot of tomatoes from her garden.   OP-  She is taking her calcium and vit D regularly.    Allergies-  She is taking allegra for allergies and it is working well for her.  Her allergies have been a little worse past few weeks due to ragweed.  She uses atrovent spray on occasion for PND  She sees the eye doctor every few yrs and got new glasses this yr, and dentist twice a yr.   She wears her seatbelt and does not text and drive.    Does not smoke or drink.  She has not she has low blood pressure on occasion.  She felt flushed one day and checked her BP and is was 90/60.   Her left shoulder is bothersome.  This occurred after she brought in too many groceries recently.  It is helped by massage.  She can a feel a knot over this area.  She has been using heat and salon pas patches and it is improving.   She will get her flu shot today.        The following portions of the patient's history were reviewed and updated as appropriate: allergies, current medications, past family history, past medical history, past social history, past surgical history and problem list.    Review of Systems   Constitutional: Negative.    HENT: Positive for hearing loss and postnasal drip.         15% hearing loss in right ear   Eyes: Negative.    Respiratory: Negative.    Cardiovascular: Negative.    Gastrointestinal: Negative.         Occasional GERD symptoms due to tomatoes   Endocrine: Negative.    Genitourinary: Negative.    Musculoskeletal:  "Positive for myalgias.        Spasm left upper back/muscle area   Skin: Negative.    Allergic/Immunologic: Positive for environmental allergies.   Neurological: Negative.    Hematological: Negative.    Psychiatric/Behavioral: Negative.        Objective   /80   Pulse 75   Temp 97 °F (36.1 °C)   Ht 168.9 cm (66.5\")   Wt 60.3 kg (133 lb)   SpO2 98%   BMI 21.15 kg/m²   Body mass index is 21.15 kg/m².  Physical Exam  Vitals and nursing note reviewed.   Constitutional:       General: She is not in acute distress.     Appearance: Normal appearance. She is well-developed. She is not ill-appearing.      Comments: Kind and pleasant female, appears stated age and in NAD today   HENT:      Head: Normocephalic and atraumatic.      Right Ear: Tympanic membrane, ear canal and external ear normal.      Left Ear: Tympanic membrane, ear canal and external ear normal.      Mouth/Throat:      Comments: White postnasal drip  Eyes:      General:         Right eye: No discharge.         Left eye: No discharge.      Extraocular Movements: Extraocular movements intact.      Conjunctiva/sclera: Conjunctivae normal.      Pupils: Pupils are equal, round, and reactive to light.   Neck:      Thyroid: No thyromegaly.      Vascular: No carotid bruit.      Comments: No thyromegaly or mass  Cardiovascular:      Rate and Rhythm: Normal rate and regular rhythm.      Pulses: Normal pulses.      Heart sounds: Normal heart sounds. No murmur heard.     Pulmonary:      Effort: Pulmonary effort is normal. No respiratory distress.      Breath sounds: Normal breath sounds. No wheezing.   Abdominal:      General: Bowel sounds are normal. There is no distension.      Palpations: Abdomen is soft.      Tenderness: There is no abdominal tenderness.   Musculoskeletal:         General: Tenderness present. Normal range of motion.      Cervical back: Normal range of motion and neck supple.      Right lower leg: No edema.      Left lower leg: No edema.      " Comments: Left trapezius muscle spasm   Lymphadenopathy:      Cervical: No cervical adenopathy.   Skin:     General: Skin is warm.      Findings: No rash.   Neurological:      General: No focal deficit present.      Mental Status: She is alert and oriented to person, place, and time. Mental status is at baseline.      Cranial Nerves: No cranial nerve deficit.      Motor: No weakness.      Coordination: Coordination normal.      Gait: Gait normal.   Psychiatric:         Mood and Affect: Mood normal.         Behavior: Behavior normal.         Thought Content: Thought content normal.         Judgment: Judgment normal.         Assessment/Plan   Marni Hussein is here today and the following problems have been addressed:      Diagnoses and all orders for this visit:    1. Physical exam (Primary)  -     CBC & Differential  -     Comprehensive Metabolic Panel  -     Lipid Panel With / Chol / HDL Ratio    2. Other hyperlipidemia  -     Comprehensive Metabolic Panel  -     Lipid Panel With / Chol / HDL Ratio    3. Gastroesophageal reflux disease with esophagitis, unspecified whether hemorrhage  -     CBC & Differential    4. Osteopenia of multiple sites    5. Acute pain of left shoulder    Other orders  -     famotidine (PEPCID) 40 MG tablet; Take 1 tablet by mouth At Night As Needed for Heartburn.  Dispense: 90 tablet; Refill: 3  -     ipratropium (ATROVENT) 0.03 % nasal spray; 2 sprays into the nostril(s) as directed by provider 3 (Three) Times a Day As Needed for Rhinitis.  Dispense: 30 mL; Refill: 1  -     FluLaval/Fluarix >6 Months (2449-1389)    Labs as noted  Recommend avoidance of processed foods  Exercise for 30 minutes 5 days a week as tolerated  Recommend annual eye doctor appointment, or as necessary  See your dentist twice a year.  Recommend that you brush teeth twice daily and floss minimum of once daily  Recommend regular seatbelt use  Do not text or use phone while driving  Increase GERD symptoms since  eating more fresh tomatoes from garden.  She may alternate Nexium with Pepcid every other day while having flare of GERD, then revert back to daily Pepcid  Continue daily calcium and vitamin D  Patient will see her gynecologist within next few months for annual exam at which time he will order DEXA scan per patient, therefore DEXA not ordered today  Flu vaccine provided today  Recommend heat to left shoulder followed by massage with tennis or racquetball.  If symptoms do not improve patient may contact me for referral to physical therapy.  She also may apply Salonpas or icy hot pain patch to area to help with pain if needed    Turn to clinic in 1 year or as needed    Please note that portions of this note were completed with a voice recognition program.  Efforts were made to edit dictation, but occasionally words are mistranscribed.

## 2021-10-08 LAB
ALBUMIN SERPL-MCNC: 4.7 G/DL (ref 3.5–5.2)
ALBUMIN/GLOB SERPL: 1.9 G/DL
ALP SERPL-CCNC: 87 U/L (ref 39–117)
ALT SERPL-CCNC: 25 U/L (ref 1–33)
AST SERPL-CCNC: 22 U/L (ref 1–32)
BASOPHILS # BLD AUTO: 0.04 10*3/MM3 (ref 0–0.2)
BASOPHILS NFR BLD AUTO: 0.7 % (ref 0–1.5)
BILIRUB SERPL-MCNC: 0.5 MG/DL (ref 0–1.2)
BUN SERPL-MCNC: 12 MG/DL (ref 8–23)
BUN/CREAT SERPL: 15 (ref 7–25)
CALCIUM SERPL-MCNC: 10.1 MG/DL (ref 8.6–10.5)
CHLORIDE SERPL-SCNC: 100 MMOL/L (ref 98–107)
CHOLEST SERPL-MCNC: 288 MG/DL (ref 0–200)
CHOLEST/HDLC SERPL: 3.95 {RATIO}
CO2 SERPL-SCNC: 27.8 MMOL/L (ref 22–29)
CREAT SERPL-MCNC: 0.8 MG/DL (ref 0.57–1)
EOSINOPHIL # BLD AUTO: 0.05 10*3/MM3 (ref 0–0.4)
EOSINOPHIL NFR BLD AUTO: 0.9 % (ref 0.3–6.2)
ERYTHROCYTE [DISTWIDTH] IN BLOOD BY AUTOMATED COUNT: 12.5 % (ref 12.3–15.4)
GLOBULIN SER CALC-MCNC: 2.5 GM/DL
GLUCOSE SERPL-MCNC: 87 MG/DL (ref 65–99)
HCT VFR BLD AUTO: 42.8 % (ref 34–46.6)
HDLC SERPL-MCNC: 73 MG/DL (ref 40–60)
HGB BLD-MCNC: 13.9 G/DL (ref 12–15.9)
IMM GRANULOCYTES # BLD AUTO: 0.01 10*3/MM3 (ref 0–0.05)
IMM GRANULOCYTES NFR BLD AUTO: 0.2 % (ref 0–0.5)
LDLC SERPL CALC-MCNC: 193 MG/DL (ref 0–100)
LYMPHOCYTES # BLD AUTO: 2.9 10*3/MM3 (ref 0.7–3.1)
LYMPHOCYTES NFR BLD AUTO: 50.3 % (ref 19.6–45.3)
MCH RBC QN AUTO: 30 PG (ref 26.6–33)
MCHC RBC AUTO-ENTMCNC: 32.5 G/DL (ref 31.5–35.7)
MCV RBC AUTO: 92.2 FL (ref 79–97)
MONOCYTES # BLD AUTO: 0.44 10*3/MM3 (ref 0.1–0.9)
MONOCYTES NFR BLD AUTO: 7.6 % (ref 5–12)
NEUTROPHILS # BLD AUTO: 2.33 10*3/MM3 (ref 1.7–7)
NEUTROPHILS NFR BLD AUTO: 40.3 % (ref 42.7–76)
NRBC BLD AUTO-RTO: 0 /100 WBC (ref 0–0.2)
PLATELET # BLD AUTO: 287 10*3/MM3 (ref 140–450)
POTASSIUM SERPL-SCNC: 4.3 MMOL/L (ref 3.5–5.2)
PROT SERPL-MCNC: 7.2 G/DL (ref 6–8.5)
RBC # BLD AUTO: 4.64 10*6/MM3 (ref 3.77–5.28)
SODIUM SERPL-SCNC: 138 MMOL/L (ref 136–145)
TRIGL SERPL-MCNC: 124 MG/DL (ref 0–150)
VLDLC SERPL CALC-MCNC: 22 MG/DL (ref 5–40)
WBC # BLD AUTO: 5.77 10*3/MM3 (ref 3.4–10.8)

## 2021-10-11 DIAGNOSIS — E78.49 OTHER HYPERLIPIDEMIA: Primary | ICD-10-CM

## 2021-10-11 NOTE — TELEPHONE ENCOUNTER
Please tell her I have placed orders and I would like her to get these done in approximately 4 months.  It will take that long to see significant change in labs.  Thank you

## 2021-10-11 NOTE — TELEPHONE ENCOUNTER
Pt agreeable to taking atorvastatin. Pt would like to know if repeat labs can be done sooner than a year from now.

## 2021-10-11 NOTE — PROGRESS NOTES
Please call patient with lab results.  Complete blood count continues to show slightly elevated lymphocytes unchanged from past few years.  Kidney and liver function are normal.  Cholesterol panel is higher than it was last year.  Total cholesterol 288 and LDL or bad cholesterol 193 and nearing 200.  She is at high risk for heart disease and stroke.  Please ask her if she is agreeable to take cholesterol medication and if so please send me order for atorvastatin 20 mg nightly, 1 year supply.  Please tell her to avoid fast and fatty foods and try to exercise for 30 minutes 5 days a week.  If she is not agreeable, please tell her to take turmeric 500 mg daily with food and cholestOff 1 tablet a.m. and p.m. with meals.

## 2021-10-12 RX ORDER — ATORVASTATIN CALCIUM 20 MG/1
20 TABLET, FILM COATED ORAL NIGHTLY
Qty: 90 TABLET | Refills: 1 | Status: SHIPPED | OUTPATIENT
Start: 2021-10-12 | End: 2022-04-15

## 2021-10-18 ENCOUNTER — HOSPITAL ENCOUNTER (OUTPATIENT)
Dept: MAMMOGRAPHY | Facility: HOSPITAL | Age: 61
Discharge: HOME | End: 2021-10-18
Admitting: OBSTETRICS & GYNECOLOGY

## 2021-10-18 DIAGNOSIS — Z12.31 VISIT FOR SCREENING MAMMOGRAM: ICD-10-CM

## 2021-10-18 PROCEDURE — 77063 BREAST TOMOSYNTHESIS BI: CPT

## 2021-10-18 PROCEDURE — 77067 SCR MAMMO BI INCL CAD: CPT | Performed by: RADIOLOGY

## 2021-10-18 PROCEDURE — 77063 BREAST TOMOSYNTHESIS BI: CPT | Performed by: RADIOLOGY

## 2021-10-18 PROCEDURE — 77067 SCR MAMMO BI INCL CAD: CPT

## 2021-12-23 ENCOUNTER — OFFICE VISIT (OUTPATIENT)
Dept: OBSTETRICS AND GYNECOLOGY | Facility: CLINIC | Age: 61
End: 2021-12-23

## 2021-12-23 VITALS
DIASTOLIC BLOOD PRESSURE: 64 MMHG | WEIGHT: 129.5 LBS | SYSTOLIC BLOOD PRESSURE: 120 MMHG | HEIGHT: 66 IN | BODY MASS INDEX: 20.81 KG/M2

## 2021-12-23 DIAGNOSIS — Z13.820 SCREENING FOR OSTEOPOROSIS: Primary | ICD-10-CM

## 2021-12-23 DIAGNOSIS — M85.80 OSTEOPENIA, SENILE: ICD-10-CM

## 2021-12-23 DIAGNOSIS — Z01.419 PAP TEST, AS PART OF ROUTINE GYNECOLOGICAL EXAMINATION: ICD-10-CM

## 2021-12-23 PROCEDURE — 99396 PREV VISIT EST AGE 40-64: CPT | Performed by: OBSTETRICS & GYNECOLOGY

## 2021-12-23 NOTE — PROGRESS NOTES
GYN Annual Exam     CC - Here for annual exam.        HPI  Marni Hussein is a 61 y.o. female, , who presents for annual well woman exam.  She is postmenopausal.  Patient denies vaginal bleeding. ..  Patient reports problems with: hot flashes, vaginal dryness and urinary urgency, HARRY. Since her last visit the patient was diagnosed with high cholesterol-started meds. Partner Status: Marital Status: .  New Partners since last visit: no.    Patient c/o vaginal dryness-using OTC lubricant prn.     Additional OB/GYN History   Current contraception: contraceptive methods: Post menopausal status  Desires to: do not start contraception  On HRT? No  Last Pap :   Last Completed Pap Smear          Ordered - PAP SMEAR (Every 5 Years) Ordered on 2021  Pap IG, Rfx HPV ASCU    06/15/2016  Done              History of abnormal Pap smear: yes - in her 30's-states h/o cryo  Family history of uterine, colon, breast, or ovarian cancer: yes - mother, and maternal cousin x1  Performs monthly Self-Breast Exam: yes  Last mammogram: 10/2021. Done at  [facility].    Last Completed Mammogram          MAMMOGRAM (Yearly) Next due on 10/18/2022    10/18/2021  Mammo Screening Digital Tomosynthesis Bilateral With CAD    10/15/2020  Mammo Screening Digital Tomosynthesis Bilateral With CAD    2019  Mammo Screening Digital Tomosynthesis Bilateral With CAD    2018  Mammo Screening Digital Tomosynthesis Bilateral With CAD    08/10/2017  Mammo Screening Digital Tomosynthesis Bilateral With CAD    Only the first 5 history entries have been loaded, but more history exists.              Last colonoscopy:   Last Completed Colonoscopy          COLORECTAL CANCER SCREENING (COLONOSCOPY - Every 10 Years) Next due on 3/14/2026    2016  SCANNED - COLONOSCOPY              Last DEXA: On 2021 and results were Osteopenia and Osteoporosis  Exercises Regularly: yes  Feelings of Anxiety or Depression:  "no      Tobacco Usage?: No   OB History        0    Para   0    Term   0       0    AB   0    Living   0       SAB   0    IAB   0    Ectopic   0    Molar        Multiple   0    Live Births                    Health Maintenance   Topic Date Due   • DXA SCAN  Never done   • ZOSTER VACCINE (1 of 2) Never done   • COVID-19 Vaccine (3 - Booster for Moderna series) 10/01/2021   • ANNUAL PHYSICAL  10/05/2022   • LIPID PANEL  10/08/2022   • MAMMOGRAM  10/18/2022   • TDAP/TD VACCINES (2 - Td or Tdap) 2025   • PAP SMEAR  2025   • COLORECTAL CANCER SCREENING  2026   • INFLUENZA VACCINE  Completed   • Pneumococcal Vaccine 0-64  Aged Out   • HEPATITIS C SCREENING  Discontinued   • Annual Gynecologic Pelvic and Breast Exam  Discontinued       The additional following portions of the patient's history were reviewed and updated as appropriate: allergies, current medications, past family history, past medical history, past social history and past surgical history.    Review of Systems   Constitutional: Negative.    HENT: Negative.    Eyes: Negative.    Respiratory: Negative.    Cardiovascular: Negative.    Gastrointestinal: Negative.    Endocrine: Negative.    Genitourinary: Positive for urgency.   Musculoskeletal: Negative.    Skin: Negative.    Allergic/Immunologic: Negative.    Neurological: Negative.    Hematological: Negative.    Psychiatric/Behavioral: Negative.        I have reviewed and agree with the HPI, ROS, and historical information as entered above. Angel Swanson MD    Objective   /64   Ht 167.6 cm (66\")   Wt 58.7 kg (129 lb 8 oz)   BMI 20.90 kg/m²     Physical Exam  Vitals and nursing note reviewed. Exam conducted with a chaperone present.   Constitutional:       Appearance: She is well-developed.   HENT:      Head: Normocephalic and atraumatic.   Neck:      Thyroid: No thyroid mass or thyromegaly.   Cardiovascular:      Rate and Rhythm: Normal rate and regular rhythm.      " Heart sounds: No murmur heard.      Pulmonary:      Effort: Pulmonary effort is normal. No retractions.      Breath sounds: Normal breath sounds. No wheezing, rhonchi or rales.   Chest:      Chest wall: No mass or tenderness.   Breasts:      Right: Normal. No mass, nipple discharge, skin change or tenderness.      Left: Normal. No mass, nipple discharge, skin change or tenderness.       Abdominal:      General: Bowel sounds are normal.      Palpations: Abdomen is soft. Abdomen is not rigid. There is no mass.      Tenderness: There is no abdominal tenderness. There is no guarding.      Hernia: No hernia is present. There is no hernia in the left inguinal area.   Genitourinary:     Labia:         Right: No rash, tenderness or lesion.         Left: No rash, tenderness or lesion.       Vagina: Normal. No vaginal discharge or lesions.      Cervix: No cervical motion tenderness, discharge, lesion or cervical bleeding.      Uterus: Normal. Not enlarged, not fixed and not tender.       Adnexa:         Right: No mass or tenderness.          Left: No mass or tenderness.        Rectum: No external hemorrhoid.   Musculoskeletal:      Cervical back: Normal range of motion. No muscular tenderness.   Neurological:      Mental Status: She is alert and oriented to person, place, and time.   Psychiatric:         Behavior: Behavior normal.            Assessment and Plan    Problem List Items Addressed This Visit     None      Visit Diagnoses     Screening for osteoporosis    -  Primary    Relevant Orders    DEXA Bone Density Axial    Pap test, as part of routine gynecological examination        Relevant Orders    Pap IG, Rfx HPV ASCU    Osteopenia, senile            BDS low but stable and she wants no meds -cont ca and Irasema D   1. GYN annual well woman exam.   2. Reviewed monthly self breast exams.  Instructed to call with lumps, pain, or breast discharge.  Yearly mammograms ordered.  3. Ordered mammogram today.  4. Recommended use of  Vitamin D and getting adequate calcium in her diet. (1500mg)  5. Osteoporosis screening ordered today.  6. Reviewed exercise as a preventative health measures.   7. RTC in 1 year or PRN with problems.  8. Other: stay on meds   9. No follow-ups on file.     Angel Swanson MD  12/23/2021

## 2022-01-05 ENCOUNTER — OFFICE VISIT (OUTPATIENT)
Dept: INTERNAL MEDICINE | Facility: CLINIC | Age: 62
End: 2022-01-05

## 2022-01-05 VITALS
OXYGEN SATURATION: 98 % | HEART RATE: 70 BPM | RESPIRATION RATE: 16 BRPM | SYSTOLIC BLOOD PRESSURE: 123 MMHG | DIASTOLIC BLOOD PRESSURE: 79 MMHG | TEMPERATURE: 97.8 F

## 2022-01-05 DIAGNOSIS — H66.003 ACUTE SUPPURATIVE OTITIS MEDIA OF BOTH EARS WITHOUT SPONTANEOUS RUPTURE OF TYMPANIC MEMBRANES, RECURRENCE NOT SPECIFIED: ICD-10-CM

## 2022-01-05 DIAGNOSIS — J20.9 ACUTE BRONCHITIS, UNSPECIFIED ORGANISM: Primary | ICD-10-CM

## 2022-01-05 DIAGNOSIS — J01.01 ACUTE RECURRENT MAXILLARY SINUSITIS: ICD-10-CM

## 2022-01-05 DIAGNOSIS — R50.9 FEVER, UNSPECIFIED FEVER CAUSE: ICD-10-CM

## 2022-01-05 PROCEDURE — 99214 OFFICE O/P EST MOD 30 MIN: CPT | Performed by: INTERNAL MEDICINE

## 2022-01-05 PROCEDURE — U0004 COV-19 TEST NON-CDC HGH THRU: HCPCS | Performed by: INTERNAL MEDICINE

## 2022-01-05 RX ORDER — CEPHALEXIN 500 MG/1
500 CAPSULE ORAL 3 TIMES DAILY
Qty: 30 CAPSULE | Refills: 0 | Status: SHIPPED | OUTPATIENT
Start: 2022-01-05 | End: 2022-01-15

## 2022-01-05 NOTE — PATIENT INSTRUCTIONS
MyPlate from USDA    MyPlate is an outline of a general healthy diet based on the 2010 Dietary Guidelines for Americans, from the U.[place]. Department of Agriculture (USDA). It sets guidelines for how much food you should eat from each food group based on your age, sex, and level of physical activity.  What are tips for following MyPlate?  To follow MyPlate recommendations:  · Eat a wide variety of fruits and vegetables, grains, and protein foods.  · Serve smaller portions and eat less food throughout the day.  · Limit portion sizes to avoid overeating.  · Enjoy your food.  · Get at least 150 minutes of exercise every week. This is about 30 minutes each day, 5 or more days per week.  It can be difficult to have every meal look like MyPlate. Think about MyPlate as eating guidelines for an entire day, rather than each individual meal.  Fruits and vegetables  · Make half of your plate fruits and vegetables.  · Eat many different colors of fruits and vegetables each day.  · For a 2,000 calorie daily food plan, eat:  ? 2½ cups of vegetables every day.  ? 2 cups of fruit every day.  · 1 cup is equal to:  ? 1 cup raw or cooked vegetables.  ? 1 cup raw fruit.  ? 1 medium-sized orange, apple, or banana.  ? 1 cup 100% fruit or vegetable juice.  ? 2 cups raw leafy greens, such as lettuce, spinach, or kale.  ? ½ cup dried fruit.  Grains  · One fourth of your plate should be grains.  · Make at least half of the grains you eat each day whole grains.  · For a 2,000 calorie daily food plan, eat 6 oz of grains every day.  · 1 oz is equal to:  ? 1 slice bread.  ? 1 cup cereal.  ? ½ cup cooked rice, cereal, or pasta.  Protein  · One fourth of your plate should be protein.  · Eat a wide variety of protein foods, including meat, poultry, fish, eggs, beans, nuts, and tofu.  · For a 2,000 calorie daily food plan, eat 5½ oz of protein every day.  · 1 oz is equal to:  ? 1 oz meat, poultry, or fish.  ? ¼ cup cooked beans.  ? 1 egg.  ? ½ oz nuts  or seeds.  ? 1 Tbsp peanut butter.  Dairy  · Drink fat-free or low-fat (1%) milk.  · Eat or drink dairy as a side to meals.  · For a 2,000 calorie daily food plan, eat or drink 3 cups of dairy every day.  · 1 cup is equal to:  ? 1 cup milk, yogurt, cottage cheese, or soy milk (soy beverage).  ? 2 oz processed cheese.  ? 1½ oz natural cheese.  Fats, oils, salt, and sugars  · Only small amounts of oils are recommended.  · Avoid foods that are high in calories and low in nutritional value (empty calories), like foods high in fat or added sugars.  · Choose foods that are low in salt (sodium). Choose foods that have less than 140 milligrams (mg) of sodium per serving.  · Drink water instead of sugary drinks. Drink enough water each day to keep your urine pale yellow.  Where to find support  · Work with your health care provider or a nutrition specialist (dietitian) to develop a customized eating plan that is right for you.  · Download an parrish (mobile application) to help you track your daily food intake.  Where to find more information  · Go to ChooseMyPlate.gov for more information.  Summary  · MyPlate is a general guideline for healthy eating from the USDA. It is based on the 2010 Dietary Guidelines for Americans.  · In general, fruits and vegetables should take up ½ of your plate, grains should take up ¼ of your plate, and protein should take up ¼ of your plate.  This information is not intended to replace advice given to you by your health care provider. Make sure you discuss any questions you have with your health care provider.  Document Revised: 05/21/2020 Document Reviewed: 03/19/2018  Elsevier Patient Education © 2021 [employer]

## 2022-01-05 NOTE — PROGRESS NOTES
Chief Complaint  Earache (right side), Nasal Congestion, Sinus Problem, Cough, and Fever    Subjective          Marni Hussein presents to [facility]  History of Present Illness  HPI:   The patient also complains of ear pain , nasal discharge , sore throat  And sinus congestion since the past few days, patient has been trying over the counter medications with not much relief in the symptoms , patient also complains of cough  And fever     Objective   Vital Signs:   /79   Pulse 70   Temp 97.8 °F (36.6 °C)   Resp 16   SpO2 98%     Physical Exam  Vitals and nursing note reviewed.   Constitutional:       General: She is not in acute distress.     Appearance: Normal appearance. She is not diaphoretic.   HENT:      Head: Normocephalic and atraumatic.      Right Ear: External ear normal. Tympanic membrane is erythematous.      Left Ear: External ear normal. Tympanic membrane is erythematous.      Nose: Rhinorrhea present.      Right Sinus: Maxillary sinus tenderness present.      Left Sinus: Maxillary sinus tenderness present.   Eyes:      Extraocular Movements: Extraocular movements intact.      Conjunctiva/sclera: Conjunctivae normal.   Neck:      Trachea: Trachea normal.   Cardiovascular:      Rate and Rhythm: Normal rate and regular rhythm.      Heart sounds: Normal heart sounds.   Pulmonary:      Effort: Pulmonary effort is normal. No respiratory distress.      Breath sounds: Normal breath sounds.   Abdominal:      General: Abdomen is flat.   Musculoskeletal:      Cervical back: Neck supple.      Comments: Moves all limbs   Skin:     General: Skin is warm.   Neurological:      Mental Status: She is alert and oriented to person, place, and time.      Comments: No gross motor or sensory deficits        Result Review :                 Assessment and Plan    Diagnoses and all orders for this visit:    1. Acute bronchitis, unspecified organism (Primary)  -     cephalexin (KEFLEX)  500 MG capsule; Take 1 capsule by mouth 3 (Three) Times a Day for 10 days.  Dispense: 30 capsule; Refill: 0    2. Acute suppurative otitis media of both ears without spontaneous rupture of tympanic membranes, recurrence not specified  -     cephalexin (KEFLEX) 500 MG capsule; Take 1 capsule by mouth 3 (Three) Times a Day for 10 days.  Dispense: 30 capsule; Refill: 0    3. Acute recurrent maxillary sinusitis  -     cephalexin (KEFLEX) 500 MG capsule; Take 1 capsule by mouth 3 (Three) Times a Day for 10 days.  Dispense: 30 capsule; Refill: 0    4. Fever, unspecified fever cause  -     COVID-19 PCR, [facility], NP SWAB IN [facility] TRANSPORT MEDIA/ORAL SWISH 24-30 HR TAT - Swab, Nasopharynx     Plan :   1. Fever: We will get Covid test  2 .acute bronchitis: We will start oral antibiotics ,   3.Acute suppurative otitis media: will   start oral antibiotics    4.Acute maxillary sinusitis:   will   start oral antibiotics    I spent   minutes caring for Marni on this date of service. This time includes time spent by me in the following activities:preparing for the visit, reviewing tests, performing a medically appropriate examination and/or evaluation , counseling and educating the patient/family/caregiver, ordering medications, tests, or procedures and documenting information in the medical record  Follow Up    Patient was given instructions and counseling regarding her condition or for health maintenance advice. Please see specific information pulled into the AVS if appropriate.

## 2022-01-07 LAB — SARS-COV-2 RNA NOSE QL NAA+PROBE: NOT DETECTED

## 2022-01-11 DIAGNOSIS — Z01.419 PAP TEST, AS PART OF ROUTINE GYNECOLOGICAL EXAMINATION: ICD-10-CM

## 2022-02-10 ENCOUNTER — TELEMEDICINE (OUTPATIENT)
Dept: INTERNAL MEDICINE | Facility: CLINIC | Age: 62
End: 2022-02-10

## 2022-02-10 VITALS
SYSTOLIC BLOOD PRESSURE: 117 MMHG | HEART RATE: 75 BPM | DIASTOLIC BLOOD PRESSURE: 72 MMHG | WEIGHT: 133 LBS | TEMPERATURE: 97.8 F | OXYGEN SATURATION: 97 % | BODY MASS INDEX: 21.38 KG/M2 | HEIGHT: 66 IN

## 2022-02-10 DIAGNOSIS — J40 BRONCHITIS: Primary | ICD-10-CM

## 2022-02-10 PROCEDURE — 99213 OFFICE O/P EST LOW 20 MIN: CPT | Performed by: INTERNAL MEDICINE

## 2022-02-10 NOTE — PROGRESS NOTES
"Chief Complaint   Patient presents with   • Abstract     Pt's had cough X 1 month.  recently tested positive for COVID.      Subjective   Marni Hussein is a 61 y.o. female.     Telemedicine video visit with patient from her home today.  Patient was seen here in clinic on January 5, 2022 with complaints of cough.  She was diagnosed with bronchitis and provided with Keflex at that time-but states she never took the ABX.  Her Covid test was negative.  Currently her  has COVID.    She continues to have a cough, it did get better.  Last week she coughed very deep and sneezed and noted a stabbing pain under her right breast.  The pain comes and goes now.      Cough  This is a chronic problem. The current episode started 1 to 4 weeks ago. The problem has been waxing and waning. The problem occurs hourly. The cough is non-productive. Associated symptoms include postnasal drip. Pertinent negatives include no chills, ear pain, fever, rhinorrhea, sore throat, shortness of breath or wheezing.        The following portions of the patient's history were reviewed and updated as appropriate: allergies, current medications, past family history, past medical history, past social history, past surgical history and problem list.    Review of Systems   Constitutional: Negative for appetite change, chills and fever.   HENT: Positive for postnasal drip. Negative for congestion, ear pain, rhinorrhea and sore throat.    Respiratory: Positive for cough. Negative for shortness of breath and wheezing.    Musculoskeletal:        Right rib pain       Objective   /72   Pulse 75   Temp 97.8 °F (36.6 °C)   Ht 167.6 cm (66\")   Wt 60.3 kg (133 lb)   SpO2 97%   BMI 21.47 kg/m²   Body mass index is 21.47 kg/m².  Physical Exam  Vitals and nursing note reviewed.   Constitutional:       General: She is not in acute distress.     Appearance: Normal appearance. She is not ill-appearing.      Comments: Kind and pleasant female in " no distress today   HENT:      Head: Normocephalic and atraumatic.      Right Ear: External ear normal.      Left Ear: External ear normal.   Eyes:      General:         Right eye: No discharge.         Left eye: No discharge.      Extraocular Movements: Extraocular movements intact.   Pulmonary:      Effort: Pulmonary effort is normal. No respiratory distress.      Comments: Occasional dry cough noted throughout our interview today, able to speak in full sentences and in no respiratory distress, she does have occasional right-sided rib pain with cough  Chest:      Chest wall: Tenderness present.   Neurological:      Mental Status: She is alert and oriented to person, place, and time.      Cranial Nerves: No cranial nerve deficit.   Psychiatric:         Mood and Affect: Mood normal.         Behavior: Behavior normal.         Thought Content: Thought content normal.         Judgment: Judgment normal.         Assessment/Plan   Marni Hussein is here today and the following problems have been addressed:      Diagnoses and all orders for this visit:    1. Bronchitis (Primary)    Ongoing viral bronchitis with residual dry cough and rib pain  Recommend heat to right lower ribs for 20 minutes 3-4 times a day  Recommend Atrovent nasal spray be used once or twice daily due to postnasal drip symptoms that are likely cause of cough  Recommend over-the-counter cough syrup as needed for current cough, patient has not been taking any medication for her cough  Recommend patient be tested for Covid, she has at home test, however may go to drive-through at hospital for testing tomorrow  Increase fluids and rest    Call or return to clinic if symptoms worsen or persist    Time devoted to visit: 18 minutes including time to review previous record, with 75% of time devoted to direct discussion  Patient presents during COVID-19 pandemic/federally declared state of public health emergency.  This service was conducted via telemedicine  video visit via [employer]        Please note that portions of this note were completed with a voice recognition program.  Efforts were made to edit dictation, but occasionally words are mistranscribed.You have chosen to receive care through a telehealth visit.  Do you consent to use a video/audio connection for your medical care today? Yes  Active Parties: Marilyn Vermeesch (PCP), Cristine Day (CMA), and Marni.  Video visit done via [email or web address].

## 2022-02-11 ENCOUNTER — LAB (OUTPATIENT)
Dept: LAB | Facility: HOSPITAL | Age: 62
End: 2022-02-11

## 2022-02-11 DIAGNOSIS — Z03.818 ENCOUNTER FOR PATIENT CONCERN ABOUT EXPOSURE TO INFECTIOUS ORGANISM: Primary | ICD-10-CM

## 2022-02-11 LAB — SARS-COV-2 RNA NOSE QL NAA+PROBE: NOT DETECTED

## 2022-02-11 PROCEDURE — U0004 COV-19 TEST NON-CDC HGH THRU: HCPCS

## 2022-02-21 ENCOUNTER — LAB (OUTPATIENT)
Dept: LAB | Facility: HOSPITAL | Age: 62
End: 2022-02-21

## 2022-02-21 DIAGNOSIS — Z03.818 ENCOUNTER FOR PATIENT CONCERN ABOUT EXPOSURE TO INFECTIOUS ORGANISM: Primary | ICD-10-CM

## 2022-02-21 LAB — SARS-COV-2 RNA NOSE QL NAA+PROBE: DETECTED

## 2022-02-21 PROCEDURE — U0004 COV-19 TEST NON-CDC HGH THRU: HCPCS

## 2022-04-15 RX ORDER — ATORVASTATIN CALCIUM 20 MG/1
TABLET, FILM COATED ORAL
Qty: 90 TABLET | Refills: 0 | Status: SHIPPED | OUTPATIENT
Start: 2022-04-15 | End: 2022-07-25

## 2022-04-15 NOTE — TELEPHONE ENCOUNTER
Rx Refill Note  Requested Prescriptions     Pending Prescriptions Disp Refills   • atorvastatin (LIPITOR) 20 MG tablet [Pharmacy Med Name: Atorvastatin Calcium Oral Tablet 20 MG] 90 tablet 0     Sig: TAKE 1 TABLET BY MOUTH EVERY DAY AT NIGHT      Last office visit with prescribing clinician: 10/4/2021      Next office visit with prescribing clinician: 10/5/2022            Fanny Capps LPN  04/15/22, 16:21 EDT

## 2022-05-05 ENCOUNTER — OFFICE VISIT (OUTPATIENT)
Dept: INTERNAL MEDICINE | Facility: CLINIC | Age: 62
End: 2022-05-05

## 2022-05-05 VITALS
HEART RATE: 73 BPM | TEMPERATURE: 97.1 F | HEIGHT: 66 IN | WEIGHT: 136 LBS | SYSTOLIC BLOOD PRESSURE: 120 MMHG | DIASTOLIC BLOOD PRESSURE: 64 MMHG | OXYGEN SATURATION: 98 % | BODY MASS INDEX: 21.86 KG/M2

## 2022-05-05 DIAGNOSIS — L57.0 ACTINIC KERATOSIS: ICD-10-CM

## 2022-05-05 DIAGNOSIS — R10.31 RLQ ABDOMINAL PAIN: Primary | ICD-10-CM

## 2022-05-05 PROBLEM — J40 BRONCHITIS: Status: RESOLVED | Noted: 2022-02-10 | Resolved: 2022-05-05

## 2022-05-05 PROCEDURE — 99214 OFFICE O/P EST MOD 30 MIN: CPT | Performed by: INTERNAL MEDICINE

## 2022-05-05 NOTE — PROGRESS NOTES
"Chief Complaint   Patient presents with   • Abstract     Would like spot on back looked at. States she's been having digestive issues.     Subjective   Marni Hussein is a 61 y.o. female.     Patient here today with complaints of a spot on her back that she would like checked out.  She was putting lotion on and noticed area.  It is not bothersome.  No itching or pain.      She is also complaining of problems with her stomach.  She is currently on Pepcid 40 mg daily and takes Tums as needed.  She denies any GERD sxs, but says she can feel some irritation in her stomach in the AM.    She had some growling and gas problems with her stomach in mid March for about a week and then she had some loose stools followed by fever blister.    Then about mid April she was a little bit constipated, then some gas and loose stools.  She had a fever blister again.   She also noted her stools are pale and almost white and ribbon shaped recently.  She has some RLQ pain on and off.    She denies any NV.  She has a good appetite.  Her weight has actually been increasing.  Sometimes she feels that she cannot eat certain foods, the smell may bother her.         The following portions of the patient's history were reviewed and updated as appropriate: allergies, current medications, past family history, past medical history, past social history, past surgical history and problem list.    Review of Systems   Constitutional: Negative for activity change, appetite change and unexpected weight change.   Gastrointestinal: Positive for nausea. Negative for abdominal pain, blood in stool and vomiting.        Abdominal discomfort  See HPI   Genitourinary: Negative.    Skin: Positive for color change.       Objective   /64   Pulse 73   Temp 97.1 °F (36.2 °C)   Ht 167.6 cm (66\")   Wt 61.7 kg (136 lb)   SpO2 98%   BMI 21.95 kg/m²   Body mass index is 21.95 kg/m².  Physical Exam  Vitals and nursing note reviewed.   Constitutional:       " General: She is not in acute distress.     Appearance: Normal appearance. She is not ill-appearing.      Comments: Kind and pleasant female in no distress today   HENT:      Right Ear: External ear normal.      Left Ear: External ear normal.   Eyes:      General:         Right eye: No discharge.         Left eye: No discharge.      Extraocular Movements: Extraocular movements intact.   Cardiovascular:      Rate and Rhythm: Normal rate and regular rhythm.      Heart sounds: Normal heart sounds. No murmur heard.  Pulmonary:      Effort: Pulmonary effort is normal. No respiratory distress.   Abdominal:      General: Abdomen is flat. Bowel sounds are normal.      Palpations: Abdomen is soft. There is no mass.      Tenderness: There is no abdominal tenderness. There is no guarding or rebound.      Comments: No hepatosplenomegaly or masses   Skin:     Comments: Mid right upper back with approximate 6 mm tan slightly elevated macular lesion consistent with AK   Neurological:      General: No focal deficit present.      Mental Status: She is alert and oriented to person, place, and time.   Psychiatric:         Mood and Affect: Mood normal.         Behavior: Behavior normal.         Thought Content: Thought content normal.         Judgment: Judgment normal.         Assessment/Plan   Marni Hussein is here today and the following problems have been addressed:      Diagnoses and all orders for this visit:    1. RLQ abdominal pain (Primary)  -     CBC & Differential  -     Comprehensive Metabolic Panel  -     Amylase  -     Lipase  -     POC Occult Blood Stool  -     POC Occult Blood Stool  -     POC Occult Blood Stool  -     H. Pylori Breath Test - Breath, Lung  -     Ambulatory Referral to Gastroenterology    2. Actinic keratosis    Labs as noted  Will check stool for blood x3  Patient has seen Dr. Ríos's in the past, last colonoscopy 2016 and was normal, colonoscopy was due in 2026.  Due to change in stool with color  change and change in shape, I recommend evaluation and possible colonoscopy.  Will refer to Dr. Ríos's for evaluation    Return to clinic as needed or as scheduled in October for annual exam     Please note that portions of this note were completed with a voice recognition program.  Efforts were made to edit dictation, but occasionally words are mistranscribed.

## 2022-05-06 LAB
ALBUMIN SERPL-MCNC: 4.6 G/DL (ref 3.8–4.8)
ALBUMIN/GLOB SERPL: 2.1 {RATIO} (ref 1.2–2.2)
ALP SERPL-CCNC: 94 IU/L (ref 44–121)
ALT SERPL-CCNC: 37 IU/L (ref 0–32)
AMYLASE SERPL-CCNC: 69 U/L (ref 31–110)
AST SERPL-CCNC: 26 IU/L (ref 0–40)
BASOPHILS # BLD AUTO: 0 X10E3/UL (ref 0–0.2)
BASOPHILS NFR BLD AUTO: 1 %
BILIRUB SERPL-MCNC: 0.3 MG/DL (ref 0–1.2)
BUN SERPL-MCNC: 11 MG/DL (ref 8–27)
BUN/CREAT SERPL: 14 (ref 12–28)
CALCIUM SERPL-MCNC: 9.6 MG/DL (ref 8.7–10.3)
CHLORIDE SERPL-SCNC: 105 MMOL/L (ref 96–106)
CO2 SERPL-SCNC: 24 MMOL/L (ref 20–29)
CREAT SERPL-MCNC: 0.77 MG/DL (ref 0.57–1)
EGFRCR SERPLBLD CKD-EPI 2021: 88 ML/MIN/1.73
EOSINOPHIL # BLD AUTO: 0.1 X10E3/UL (ref 0–0.4)
EOSINOPHIL NFR BLD AUTO: 1 %
ERYTHROCYTE [DISTWIDTH] IN BLOOD BY AUTOMATED COUNT: 12.3 % (ref 11.7–15.4)
GLOBULIN SER CALC-MCNC: 2.2 G/DL (ref 1.5–4.5)
GLUCOSE SERPL-MCNC: 86 MG/DL (ref 65–99)
HCT VFR BLD AUTO: 41.5 % (ref 34–46.6)
HGB BLD-MCNC: 13.8 G/DL (ref 11.1–15.9)
IMM GRANULOCYTES # BLD AUTO: 0 X10E3/UL (ref 0–0.1)
IMM GRANULOCYTES NFR BLD AUTO: 0 %
LIPASE SERPL-CCNC: 69 U/L (ref 14–72)
LYMPHOCYTES # BLD AUTO: 2.8 X10E3/UL (ref 0.7–3.1)
LYMPHOCYTES NFR BLD AUTO: 44 %
MCH RBC QN AUTO: 30.1 PG (ref 26.6–33)
MCHC RBC AUTO-ENTMCNC: 33.3 G/DL (ref 31.5–35.7)
MCV RBC AUTO: 90 FL (ref 79–97)
MONOCYTES # BLD AUTO: 0.5 X10E3/UL (ref 0.1–0.9)
MONOCYTES NFR BLD AUTO: 7 %
NEUTROPHILS # BLD AUTO: 3.1 X10E3/UL (ref 1.4–7)
NEUTROPHILS NFR BLD AUTO: 47 %
PLATELET # BLD AUTO: 288 X10E3/UL (ref 150–450)
POTASSIUM SERPL-SCNC: 4.9 MMOL/L (ref 3.5–5.2)
PROT SERPL-MCNC: 6.8 G/DL (ref 6–8.5)
RBC # BLD AUTO: 4.59 X10E6/UL (ref 3.77–5.28)
SODIUM SERPL-SCNC: 144 MMOL/L (ref 134–144)
UREA BREATH TEST QL: NEGATIVE
WBC # BLD AUTO: 6.5 X10E3/UL (ref 3.4–10.8)

## 2022-05-06 NOTE — PROGRESS NOTES
Please let patient know her complete blood count, kidney and liver function as well as pancreatic function are normal.  Her H. pylori test is pending and we will contact her with that result when available.  Thank you so much

## 2022-05-09 ENCOUNTER — CLINICAL SUPPORT (OUTPATIENT)
Dept: INTERNAL MEDICINE | Facility: CLINIC | Age: 62
End: 2022-05-09

## 2022-05-09 DIAGNOSIS — R10.31 RLQ ABDOMINAL PAIN: ICD-10-CM

## 2022-05-09 LAB
DEVELOPER EXPIRATION DATE: ABNORMAL
DEVELOPER EXPIRATION DATE: ABNORMAL
DEVELOPER EXPIRATION DATE: NORMAL
DEVELOPER LOT NUMBER: ABNORMAL
DEVELOPER LOT NUMBER: ABNORMAL
DEVELOPER LOT NUMBER: NORMAL
EXPIRATION DATE: ABNORMAL
EXPIRATION DATE: ABNORMAL
EXPIRATION DATE: NORMAL
FECAL OCCULT BLOOD SCREEN, POC: NEGATIVE
FECAL OCCULT BLOOD SCREEN, POC: POSITIVE
FECAL OCCULT BLOOD SCREEN, POC: POSITIVE
Lab: ABNORMAL
Lab: ABNORMAL
Lab: NORMAL
NEGATIVE CONTROL: NEGATIVE
POSITIVE CONTROL: POSITIVE

## 2022-05-09 PROCEDURE — 82270 OCCULT BLOOD FECES: CPT | Performed by: INTERNAL MEDICINE

## 2022-05-09 NOTE — PROGRESS NOTES
Please tell patient her H. pylori breath test is negative.  I will contact her when I receive her stool cards.  Please tell her to follow through with GI consult.

## 2022-05-09 NOTE — PROGRESS NOTES
Please tell patient that 2 out of 3 of her stool tests are positive for blood.  Please make sure that she understands this and tells her GI doctor this also.  Thank you

## 2022-05-10 ENCOUNTER — OFFICE VISIT (OUTPATIENT)
Dept: GASTROENTEROLOGY | Facility: CLINIC | Age: 62
End: 2022-05-10

## 2022-05-10 VITALS
TEMPERATURE: 98 F | BODY MASS INDEX: 22.11 KG/M2 | WEIGHT: 137 LBS | DIASTOLIC BLOOD PRESSURE: 73 MMHG | HEART RATE: 73 BPM | SYSTOLIC BLOOD PRESSURE: 136 MMHG

## 2022-05-10 DIAGNOSIS — K92.1 BLOOD IN STOOL: Primary | ICD-10-CM

## 2022-05-10 DIAGNOSIS — R10.31 RIGHT LOWER QUADRANT ABDOMINAL PAIN: ICD-10-CM

## 2022-05-10 PROCEDURE — 99204 OFFICE O/P NEW MOD 45 MIN: CPT | Performed by: INTERNAL MEDICINE

## 2022-05-10 NOTE — PROGRESS NOTES
"GASTROENTEROLOGY OFFICE NOTE  Marni Hussein  [id number]  1960        Chief Complaint   Patient presents with   • Abdominal Pain     RLQ         HISTORY OF PRESENT ILLNESS:  61-year-old white female who I am seeing for the first time today for evaluation of Hemoccult positive stools right lower quadrant abdominal pain with last colonoscopy 2016 reportedly within normal limits and at which time she was having similar right lower quadrant type pain.  She does have a history of multiple laparoscopies for endometriosis and likely has intra-abdominal adhesions.    He does not have any problems with dysphagia to solids, odynophagia, early satiety, unexplained weight loss, melena or bright red blood per rectum.  She does describe feeling \"noises \"and \"smells \"and then having diarrhea.  About every few weeks she will have a isolated episode of this and is asymptomatic from a GI standpoint otherwise.    PAST MEDICAL HISTORY  Past Medical History:   Diagnosis Date   • Abnormal Pap smear of cervix    • Acute appendicitis with localized peritonitis 9/23/2017    Added automatically from request for surgery [id number]   • Allergic    • Anemia    • Breast injury     bruising as a teenager   • Cervical dysplasia    • Depression    • Endometriosis    • Fibrocystic breast    • GERD (gastroesophageal reflux disease)    • HPV (human papilloma virus) infection    • Hyperlipidemia    • Osteopenia    • Ovarian cyst    • Peptic ulceration    • Urinary tract infection         PAST SURGICAL HISTORY  Past Surgical History:   Procedure Laterality Date   • APPENDECTOMY N/A 9/23/2017    Procedure: APPENDECTOMY LAPAROSCOPIC;  Surgeon: Etienne Branham MD;  Location: [facility];  Service:    • BREAST CYST ASPIRATION Left    • BREAST CYST EXCISION  1980   • BREAST EXCISIONAL BIOPSY Right     1982   • COLPOSCOPY      in her 30's   • CRYOTHERAPY      abnormal pap smear in her 30's   • DIAGNOSTIC LAPAROSCOPY      age 42, endo   • PELVIC LAPAROSCOPY  " 2002        MEDICATIONS:    Current Outpatient Medications:   •  atorvastatin (LIPITOR) 20 MG tablet, TAKE 1 TABLET BY MOUTH EVERY DAY AT NIGHT, Disp: 90 tablet, Rfl: 0  •  calcium carbonate (TUMS) 500 MG chewable tablet, Chew., Disp: , Rfl:   •  CALCIUM PO, Take  by mouth., Disp: , Rfl:   •  famotidine (PEPCID) 40 MG tablet, Take 1 tablet by mouth At Night As Needed for Heartburn., Disp: 90 tablet, Rfl: 3  •  Fexofenadine HCl (ALLEGRA PO), Take  by mouth., Disp: , Rfl:   •  ipratropium (ATROVENT) 0.03 % nasal spray, 2 sprays into the nostril(s) as directed by provider 3 (Three) Times a Day As Needed for Rhinitis., Disp: 30 mL, Rfl: 1  •  VITAMIN D PO, Take  by mouth., Disp: , Rfl:     ALLERGIES  Allergies   Allergen Reactions   • Sulfa Antibiotics        FAMILY HISTORY:  Family History   Problem Relation Age of Onset   • Breast cancer Mother 54   • Cancer Mother    • Hypertension Mother    • ANTOLIN disease Mother    • Hyperlipidemia Mother    • Heart disease Mother         mild CAD   • Asthma Mother    • Hypertension Father    • Hyperlipidemia Father    • Hypertension Sister    • Hyperlipidemia Sister    • Fibromyalgia Sister    • Hypertension Brother    • Hyperlipidemia Brother    • Hypertension Maternal Aunt    • Hyperlipidemia Maternal Aunt    • Cancer Maternal Aunt    • Hypertension Maternal Uncle    • Heart disease Maternal Uncle    • Hypertension Paternal Aunt    • Hypertension Paternal Uncle    • Hyperlipidemia Paternal Uncle    • Heart disease Paternal Uncle         pacemaker   • Stroke Maternal Grandmother    • Heart disease Maternal Grandmother    • Hyperlipidemia Maternal Grandmother    • Hypertension Maternal Grandmother    • Heart disease Maternal Grandfather    • Hyperlipidemia Maternal Grandfather    • Hypertension Maternal Grandfather    • Heart attack Maternal Grandfather    • Tuberculosis Paternal Grandmother    • Hypertension Paternal Grandfather    • Hyperlipidemia Paternal Grandfather    • Heart  disease Paternal Grandfather    • Stroke Paternal Grandfather    • Brain cancer Maternal Aunt    • Stroke Maternal Aunt    • Hypertension Maternal Aunt    • Hyperlipidemia Maternal Aunt    • Heart disease Maternal Aunt    • Hypertension Maternal Uncle    • Hyperlipidemia Maternal Uncle    • Heart disease Maternal Uncle    • Heart attack Maternal Uncle    • Stroke Maternal Uncle    • Hypertension Paternal Uncle    • Hyperlipidemia Paternal Uncle    • Heart disease Paternal Uncle    • Heart attack Paternal Uncle    • Breast cancer Cousin    • Ovarian cancer Neg Hx    • Endometrial cancer Neg Hx    • Uterine cancer Neg Hx    • Colon cancer Neg Hx        SOCIAL HISTORY  Social History     Socioeconomic History   • Marital status:    Tobacco Use   • Smoking status: Never Smoker   • Smokeless tobacco: Never Used   Vaping Use   • Vaping Use: Never used   Substance and Sexual Activity   • Alcohol use: Yes     Comment: 2-3 glasses of wine yearly   • Drug use: No   • Sexual activity: Not Currently     Partners: Male     Birth control/protection: Post-menopausal     Socioeconomic History:  She is retired.  She is a teacher/retired .  Non-smoker.  Rarely drinks alcoholic beverages.  She does not have any children           PHYSICAL EXAM   There were no vitals taken for this visit.  General: Alert and oriented x 3. In no apparent or acute distress.  and No stigmata of chronic liver disease  HEENT: Anicteric sclerae. Normal oropharynx  Neck: Supple. Without lymphadenopathy  CV: Regular rate and rhythm, S1, S2  Lungs: Clear to ausculation. Without rales, rhonchi and wheezing  Abdomen:  Soft,non-distended without palpable masses or hepatosplenomeagaly, areas of rebound tenderness or guarding.   Extremeties: without clubbing, cyanosis or edema  Neurologic:  Alert and oriented x 3 without focal motor or sensory deficits  Rectal exam: deferred       ASSESSMENT  1.-  Hemoccult positive.  Rule out colonic  neoplasia  2.-  Right lower quadrant abdominal pain.  This is likely due to intra-abdominal adhesions.  Conservative management recommended.  We will exclude colonic pathology given the need for colonoscopy based on Hemoccult positive stools  3.-  History of elevated (ALT to 37 units/L.).    PLAN  1.-Proceed with colonoscopy  2.-Monitor serum liver enzymes.  Consider work-up if persistently elevated over 11 signs are noted        Destin Gutiérrez MD  5/10/2022   15:23 EDT        Addendum from May 12, 2022  Colonoscopy revealed nothing more than a 4 mm sigmoid colon polyp bowel prep and visualization was excellent the terminal ileum was normal as was ileocecal valve.  Scope was retroflexed in cecum and rectum and no other abnormalities were noted.  As noted above lower abdominal pain is felt to be due to intra-abdominal adhesions.  Should be managed conservatively.

## 2022-05-12 ENCOUNTER — OUTSIDE FACILITY SERVICE (OUTPATIENT)
Dept: GASTROENTEROLOGY | Facility: CLINIC | Age: 62
End: 2022-05-12

## 2022-05-12 PROCEDURE — 88305 TISSUE EXAM BY PATHOLOGIST: CPT | Performed by: INTERNAL MEDICINE

## 2022-05-12 PROCEDURE — 45385 COLONOSCOPY W/LESION REMOVAL: CPT | Performed by: INTERNAL MEDICINE

## 2022-05-13 ENCOUNTER — LAB REQUISITION (OUTPATIENT)
Dept: LAB | Facility: HOSPITAL | Age: 62
End: 2022-05-13

## 2022-05-13 DIAGNOSIS — K92.1 MELENA: ICD-10-CM

## 2022-05-13 DIAGNOSIS — K64.8 OTHER HEMORRHOIDS: ICD-10-CM

## 2022-05-13 DIAGNOSIS — D12.5 BENIGN NEOPLASM OF SIGMOID COLON: ICD-10-CM

## 2022-05-13 DIAGNOSIS — Z83.71 FAMILY HISTORY OF COLONIC POLYPS: ICD-10-CM

## 2022-05-13 DIAGNOSIS — R19.4 CHANGE IN BOWEL HABIT: ICD-10-CM

## 2022-05-13 DIAGNOSIS — R10.31 RIGHT LOWER QUADRANT PAIN: ICD-10-CM

## 2022-05-16 LAB
CYTO UR: NORMAL
LAB AP CASE REPORT: NORMAL
LAB AP CLINICAL INFORMATION: NORMAL
PATH REPORT.FINAL DX SPEC: NORMAL
PATH REPORT.GROSS SPEC: NORMAL

## 2022-07-25 RX ORDER — ATORVASTATIN CALCIUM 20 MG/1
TABLET, FILM COATED ORAL
Qty: 90 TABLET | Refills: 1 | Status: SHIPPED | OUTPATIENT
Start: 2022-07-25 | End: 2023-01-27

## 2022-07-25 NOTE — TELEPHONE ENCOUNTER
Rx Refill Note  Requested Prescriptions     Pending Prescriptions Disp Refills   • atorvastatin (LIPITOR) 20 MG tablet [Pharmacy Med Name: Atorvastatin Calcium Oral Tablet 20 MG] 90 tablet 0     Sig: TAKE 1 TABLET BY MOUTH EVERY DAY AT NIGHT      Last office visit with prescribing clinician: 5/5/2022      Next office visit with prescribing clinician: 10/5/2022            Emily Hightower LPN  07/25/22, 13:30 EDT

## 2022-10-07 ENCOUNTER — OFFICE VISIT (OUTPATIENT)
Dept: INTERNAL MEDICINE | Facility: CLINIC | Age: 62
End: 2022-10-07

## 2022-10-07 VITALS
TEMPERATURE: 98 F | WEIGHT: 138 LBS | DIASTOLIC BLOOD PRESSURE: 80 MMHG | SYSTOLIC BLOOD PRESSURE: 120 MMHG | BODY MASS INDEX: 22.18 KG/M2 | HEIGHT: 66 IN | OXYGEN SATURATION: 99 % | HEART RATE: 78 BPM

## 2022-10-07 DIAGNOSIS — Z23 NEED FOR INFLUENZA VACCINATION: ICD-10-CM

## 2022-10-07 DIAGNOSIS — Z00.00 PHYSICAL EXAM: ICD-10-CM

## 2022-10-07 DIAGNOSIS — M62.838 MUSCLE SPASM OF LEFT SHOULDER AREA: ICD-10-CM

## 2022-10-07 PROBLEM — R10.31 RLQ ABDOMINAL PAIN: Status: RESOLVED | Noted: 2022-05-05 | Resolved: 2022-10-07

## 2022-10-07 PROBLEM — M25.512 ACUTE PAIN OF LEFT SHOULDER: Status: RESOLVED | Noted: 2021-10-04 | Resolved: 2022-10-07

## 2022-10-07 PROCEDURE — 99396 PREV VISIT EST AGE 40-64: CPT | Performed by: INTERNAL MEDICINE

## 2022-10-07 PROCEDURE — 90686 IIV4 VACC NO PRSV 0.5 ML IM: CPT | Performed by: INTERNAL MEDICINE

## 2022-10-07 PROCEDURE — 90471 IMMUNIZATION ADMIN: CPT | Performed by: INTERNAL MEDICINE

## 2022-10-07 NOTE — PROGRESS NOTES
Chief Complaint   Patient presents with   • Annual Exam     Subjective   Marni Hussein is a 62 y.o. female.     History of Present Illness  Here today for annual PE.   PMH of HLD, GERD, OP, allergies.    HCM- colonoscopy 2022-due in 6 yr.  Mammogram due next month.  Pap 2021.  All vaccines UTD, seasonal flu shot given today, she will get COVID booster in a few weeks.   HLD-  She is eating a HH diet.  She is walking for exercise 4-5 times a week for about 40 minutes.    GERD- does have some GERD, takes pepcid regularly.  May have some GERD if she eats tomatoes.  OP-  She is taking her calcium and vit D regularly.    Allergies-  She is taking allegra for allergies and it is working well for her.   She sees the eye doctor every few yrs, and dentist twice a yr.   She wears her seatbelt and does not text and drive.    Does not smoke or drink.  She has some pain in both feet, worse on right.  Has had it all summer.  She has been doing some stretches for PF. She is wearing some insoles for PF.  Her left foot is improving.  She has not seen a foot doctor.    She complains of left neck/shoulder pain on and off, has had this in the past.        The following portions of the patient's history were reviewed and updated as appropriate: allergies, current medications, past family history, past medical history, past social history, past surgical history and problem list.    Review of Systems   Constitutional: Negative for activity change, appetite change and unexpected weight change.   HENT: Negative for hearing loss, tinnitus and trouble swallowing.    Eyes: Negative for visual disturbance.   Respiratory: Negative for shortness of breath.    Cardiovascular: Negative for chest pain, palpitations and leg swelling.   Gastrointestinal: Negative for abdominal pain, constipation and diarrhea.        Occasional GERD symptoms   Endocrine: Negative.    Genitourinary: Positive for urgency.   Musculoskeletal: Positive for neck pain and  "neck stiffness. Negative for back pain.        Bilateral foot pain   Allergic/Immunologic: Negative for environmental allergies.   Neurological: Negative for headaches.   Hematological: Negative.    Psychiatric/Behavioral: Negative for dysphoric mood and sleep disturbance.       Objective   /80   Pulse 78   Temp 98 °F (36.7 °C)   Ht 167.6 cm (66\")   Wt 62.6 kg (138 lb)   SpO2 99%   BMI 22.27 kg/m²   Body mass index is 22.27 kg/m².  Physical Exam  Vitals and nursing note reviewed.   Constitutional:       General: She is not in acute distress.     Appearance: Normal appearance. She is well-developed. She is not ill-appearing.      Comments: Kind and pleasant female , appears stated age and in NAD today   HENT:      Head: Normocephalic and atraumatic.      Right Ear: Tympanic membrane, ear canal and external ear normal.      Left Ear: Tympanic membrane, ear canal and external ear normal.      Nose: No congestion.      Mouth/Throat:      Pharynx: No posterior oropharyngeal erythema.   Eyes:      General:         Right eye: No discharge.         Left eye: No discharge.      Extraocular Movements: Extraocular movements intact.      Conjunctiva/sclera: Conjunctivae normal.      Pupils: Pupils are equal, round, and reactive to light.   Neck:      Thyroid: No thyromegaly.      Vascular: No carotid bruit.      Comments: No thyromegaly or mass  Cardiovascular:      Rate and Rhythm: Normal rate and regular rhythm.      Pulses: Normal pulses.      Heart sounds: Normal heart sounds. No murmur heard.  Pulmonary:      Effort: Pulmonary effort is normal. No respiratory distress.      Breath sounds: Normal breath sounds. No wheezing.   Abdominal:      General: Bowel sounds are normal. There is no distension.      Palpations: Abdomen is soft.      Tenderness: There is no abdominal tenderness.   Musculoskeletal:         General: Tenderness present. Normal range of motion.      Cervical back: Normal range of motion and neck " supple.      Right lower leg: No edema.      Left lower leg: No edema.      Comments: Tenderness over left trapezius muscle  Left foot pain at Achilles tendon and lateral heel   Lymphadenopathy:      Cervical: No cervical adenopathy.   Skin:     General: Skin is warm.      Findings: No rash.   Neurological:      General: No focal deficit present.      Mental Status: She is alert and oriented to person, place, and time. Mental status is at baseline.      Cranial Nerves: No cranial nerve deficit.      Motor: No weakness.      Coordination: Coordination normal.      Gait: Gait normal.   Psychiatric:         Mood and Affect: Mood normal.         Behavior: Behavior normal.         Thought Content: Thought content normal.         Judgment: Judgment normal.         Assessment & Plan   Marni Hussein is here today and the following problems have been addressed:      Diagnoses and all orders for this visit:    1. Physical exam  -     CBC & Differential  -     Comprehensive Metabolic Panel  -     Lipid Panel With / Chol / HDL Ratio    2. Need for influenza vaccination  -     FluLaval/Fluzone >6 mos ([id number])    3. Muscle spasm of left shoulder area  -     Ambulatory Referral to Physical Therapy Evaluate and treat    Labs as noted  Recommend avoidance of processed foods  Exercise for 30 minutes 5 days a week as tolerated  Recommend annual eye doctor appointment, or as necessary  See your dentist twice a year.  Recommend that you brush teeth twice daily and floss minimum of once daily  Recommend regular seatbelt use  Do not text or use phone while driving  Referred to physical therapy for chronic left neck and shoulder spasm  Mammogram scheduled next month  Continue stretches for bilateral Planter fasciitis, gave patient name of Dr. Howard foot doctor in [facility] and she will make appointment if needed  Continue Allegra for allergies as needed  Continue atorvastatin for hyperlipidemia  Encouraged daily vitamin D and plant-based  calcium  Flu vaccine provided today, she will get updated COVID booster in next 1 to 2 weeks    Return to clinic in 1 year for annual physical or as needed    Please note that portions of this note were completed with a voice recognition program.  Efforts were made to edit dictation, but occasionally words are mistranscribed.

## 2022-10-10 ENCOUNTER — TRANSCRIBE ORDERS (OUTPATIENT)
Dept: ADMINISTRATIVE | Facility: HOSPITAL | Age: 62
End: 2022-10-10

## 2022-10-10 DIAGNOSIS — Z12.31 VISIT FOR SCREENING MAMMOGRAM: Primary | ICD-10-CM

## 2022-10-11 ENCOUNTER — HOSPITAL ENCOUNTER (OUTPATIENT)
Dept: MAMMOGRAPHY | Facility: HOSPITAL | Age: 62
Discharge: HOME | End: 2022-10-11
Admitting: OBSTETRICS & GYNECOLOGY

## 2022-10-11 DIAGNOSIS — Z12.31 VISIT FOR SCREENING MAMMOGRAM: ICD-10-CM

## 2022-10-11 LAB
ALBUMIN SERPL-MCNC: 4.7 G/DL (ref 3.5–5.2)
ALBUMIN/GLOB SERPL: 2.5 G/DL
ALP SERPL-CCNC: 88 U/L (ref 39–117)
ALT SERPL-CCNC: 21 U/L (ref 1–33)
AST SERPL-CCNC: 22 U/L (ref 1–32)
BASOPHILS # BLD AUTO: 0.06 10*3/MM3 (ref 0–0.2)
BASOPHILS NFR BLD AUTO: 1 % (ref 0–1.5)
BILIRUB SERPL-MCNC: 0.7 MG/DL (ref 0–1.2)
BUN SERPL-MCNC: 13 MG/DL (ref 8–23)
BUN/CREAT SERPL: 16.9 (ref 7–25)
CALCIUM SERPL-MCNC: 9.7 MG/DL (ref 8.6–10.5)
CHLORIDE SERPL-SCNC: 103 MMOL/L (ref 98–107)
CHOLEST SERPL-MCNC: 176 MG/DL (ref 0–200)
CHOLEST/HDLC SERPL: 2.44 {RATIO}
CO2 SERPL-SCNC: 29 MMOL/L (ref 22–29)
CREAT SERPL-MCNC: 0.77 MG/DL (ref 0.57–1)
EGFRCR SERPLBLD CKD-EPI 2021: 87.3 ML/MIN/1.73
EOSINOPHIL # BLD AUTO: 0.05 10*3/MM3 (ref 0–0.4)
EOSINOPHIL NFR BLD AUTO: 0.8 % (ref 0.3–6.2)
ERYTHROCYTE [DISTWIDTH] IN BLOOD BY AUTOMATED COUNT: 11.8 % (ref 12.3–15.4)
GLOBULIN SER CALC-MCNC: 1.9 GM/DL
GLUCOSE SERPL-MCNC: 84 MG/DL (ref 65–99)
HCT VFR BLD AUTO: 38.3 % (ref 34–46.6)
HDLC SERPL-MCNC: 72 MG/DL (ref 40–60)
HGB BLD-MCNC: 13.3 G/DL (ref 12–15.9)
IMM GRANULOCYTES # BLD AUTO: 0.01 10*3/MM3 (ref 0–0.05)
IMM GRANULOCYTES NFR BLD AUTO: 0.2 % (ref 0–0.5)
LDLC SERPL CALC-MCNC: 90 MG/DL (ref 0–100)
LYMPHOCYTES # BLD AUTO: 2.87 10*3/MM3 (ref 0.7–3.1)
LYMPHOCYTES NFR BLD AUTO: 48.4 % (ref 19.6–45.3)
MCH RBC QN AUTO: 30.2 PG (ref 26.6–33)
MCHC RBC AUTO-ENTMCNC: 34.7 G/DL (ref 31.5–35.7)
MCV RBC AUTO: 86.8 FL (ref 79–97)
MONOCYTES # BLD AUTO: 0.46 10*3/MM3 (ref 0.1–0.9)
MONOCYTES NFR BLD AUTO: 7.8 % (ref 5–12)
NEUTROPHILS # BLD AUTO: 2.48 10*3/MM3 (ref 1.7–7)
NEUTROPHILS NFR BLD AUTO: 41.8 % (ref 42.7–76)
NRBC BLD AUTO-RTO: 0 /100 WBC (ref 0–0.2)
PLATELET # BLD AUTO: 242 10*3/MM3 (ref 140–450)
POTASSIUM SERPL-SCNC: 4.6 MMOL/L (ref 3.5–5.2)
PROT SERPL-MCNC: 6.6 G/DL (ref 6–8.5)
RBC # BLD AUTO: 4.41 10*6/MM3 (ref 3.77–5.28)
SODIUM SERPL-SCNC: 141 MMOL/L (ref 136–145)
TRIGL SERPL-MCNC: 78 MG/DL (ref 0–150)
VLDLC SERPL CALC-MCNC: 14 MG/DL (ref 5–40)
WBC # BLD AUTO: 5.93 10*3/MM3 (ref 3.4–10.8)

## 2022-10-11 PROCEDURE — 77067 SCR MAMMO BI INCL CAD: CPT | Performed by: RADIOLOGY

## 2022-10-11 PROCEDURE — 77063 BREAST TOMOSYNTHESIS BI: CPT | Performed by: RADIOLOGY

## 2022-10-11 PROCEDURE — 77063 BREAST TOMOSYNTHESIS BI: CPT

## 2022-10-11 PROCEDURE — 77067 SCR MAMMO BI INCL CAD: CPT

## 2022-10-22 RX ORDER — FAMOTIDINE 40 MG/1
TABLET, FILM COATED ORAL
Qty: 90 TABLET | Refills: 0 | Status: SHIPPED | OUTPATIENT
Start: 2022-10-22 | End: 2023-01-27

## 2022-12-29 ENCOUNTER — OFFICE VISIT (OUTPATIENT)
Dept: OBSTETRICS AND GYNECOLOGY | Facility: CLINIC | Age: 62
End: 2022-12-29

## 2022-12-29 VITALS — SYSTOLIC BLOOD PRESSURE: 116 MMHG | BODY MASS INDEX: 22.79 KG/M2 | WEIGHT: 141.2 LBS | DIASTOLIC BLOOD PRESSURE: 70 MMHG

## 2022-12-29 DIAGNOSIS — Z13.820 SCREENING FOR OSTEOPOROSIS: Primary | ICD-10-CM

## 2022-12-29 DIAGNOSIS — Z01.419 PAP TEST, AS PART OF ROUTINE GYNECOLOGICAL EXAMINATION: Primary | ICD-10-CM

## 2022-12-29 DIAGNOSIS — M85.80 OSTEOPENIA, SENILE: ICD-10-CM

## 2022-12-29 PROCEDURE — 99396 PREV VISIT EST AGE 40-64: CPT | Performed by: OBSTETRICS & GYNECOLOGY
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
## 2023-01-04 LAB — REF LAB TEST METHOD: NORMAL

## 2023-01-27 RX ORDER — FAMOTIDINE 40 MG/1
TABLET, FILM COATED ORAL
Qty: 90 TABLET | Refills: 1 | Status: SHIPPED | OUTPATIENT
Start: 2023-01-27

## 2023-01-27 RX ORDER — ATORVASTATIN CALCIUM 20 MG/1
TABLET, FILM COATED ORAL
Qty: 90 TABLET | Refills: 1 | Status: SHIPPED | OUTPATIENT
Start: 2023-01-27

## 2023-04-13 ENCOUNTER — OFFICE VISIT (OUTPATIENT)
Dept: INTERNAL MEDICINE | Facility: CLINIC | Age: 63
End: 2023-04-13
Payer: COMMERCIAL

## 2023-04-13 ENCOUNTER — HOSPITAL ENCOUNTER (OUTPATIENT)
Dept: GENERAL RADIOLOGY | Facility: HOSPITAL | Age: 63
Discharge: HOME | End: 2023-04-13
Admitting: FAMILY MEDICINE
Payer: COMMERCIAL

## 2023-04-13 VITALS
OXYGEN SATURATION: 98 % | HEART RATE: 68 BPM | TEMPERATURE: 98 F | SYSTOLIC BLOOD PRESSURE: 124 MMHG | BODY MASS INDEX: 22.18 KG/M2 | DIASTOLIC BLOOD PRESSURE: 62 MMHG | WEIGHT: 138 LBS | HEIGHT: 66 IN

## 2023-04-13 DIAGNOSIS — R05.3 PERSISTENT COUGH FOR 3 WEEKS OR LONGER: Primary | ICD-10-CM

## 2023-04-13 DIAGNOSIS — J30.2 SEASONAL ALLERGIC RHINITIS, UNSPECIFIED TRIGGER: ICD-10-CM

## 2023-04-13 DIAGNOSIS — K21.00 GASTROESOPHAGEAL REFLUX DISEASE WITH ESOPHAGITIS, UNSPECIFIED WHETHER HEMORRHAGE: ICD-10-CM

## 2023-04-13 PROCEDURE — 71046 X-RAY EXAM CHEST 2 VIEWS: CPT

## 2023-04-13 PROCEDURE — 99214 OFFICE O/P EST MOD 30 MIN: CPT | Performed by: FAMILY MEDICINE

## 2023-04-13 RX ORDER — IPRATROPIUM BROMIDE 21 UG/1
2 SPRAY, METERED NASAL 3 TIMES DAILY PRN
Qty: 30 ML | Refills: 2 | Status: SHIPPED | OUTPATIENT
Start: 2023-04-13

## 2023-04-13 RX ORDER — MONTELUKAST SODIUM 10 MG/1
10 TABLET ORAL NIGHTLY
Qty: 90 TABLET | Refills: 1 | Status: SHIPPED | OUTPATIENT
Start: 2023-04-13

## 2023-04-13 RX ORDER — ATORVASTATIN CALCIUM 20 MG/1
20 TABLET, FILM COATED ORAL
Qty: 90 TABLET | Refills: 1 | Status: SHIPPED | OUTPATIENT
Start: 2023-04-13

## 2023-04-13 RX ORDER — FAMOTIDINE 40 MG/1
40 TABLET, FILM COATED ORAL NIGHTLY PRN
Qty: 90 TABLET | Refills: 1 | Status: SHIPPED | OUTPATIENT
Start: 2023-04-13

## 2023-04-13 RX ORDER — BENZONATATE 200 MG/1
200 CAPSULE ORAL 3 TIMES DAILY PRN
Qty: 30 CAPSULE | Refills: 1 | Status: SHIPPED | OUTPATIENT
Start: 2023-04-13

## 2023-04-13 NOTE — PROGRESS NOTES
"Marni Hussein is a 62 y.o. female.    Chief Complaint   Patient presents with   • Cough     For several months.        ISSA Hussein is a 62-year-old female who presents today to [facility] care as her previous PCP is retired. She complains today of a cough for several months.    The patient has been experiencing wheezing and coughing since 11/2022. She initially had additional symptoms as well.  Most symptoms resolved, but she continued to experience a cough and she still has it now. It has improved slightly. Sometimes it becomes \"tickly\" and feels like she may have mucus she coughs up sometimes, primarily in the evening. The cough can be dry sometimes as well. She reports mucus drainage in her throat today. The patient takes an allergy pill every night because she usually experiences drainage then. She has been experiencing nasal congestion for the last week. Sometimes she experiences dyspnea with coughing. 5 years ago, the patient was seen by her allergist Dr. Armenta years ago, because she was experiencing wet mucus constantly in her throat and coughing. An allergy test was obtained which revealed multiple allergies, primarily to mold and mildew. She was prescribed montelukast and an inhaler as well, but never used the inhaler. Her symptoms improved once the mold was removed within her home. The patient experienced bronchitis multiple times last year.  The patient has not experienced an increase in heartburn or reflux. She continues to take famotidine at night.    The patient does not have a personal history of asthma, but she does have a family history of asthma. Her mother and sister use inhalers as needed.    The patient reports a rash on her neck like sandpaper when she had bronchitis. It could not be seen, but she was able to feel it. It would be there for some time, resolved, and returned again. Sometimes she would \"flush\" when it occurred.    The following portions of the patient's history were " "reviewed and updated as appropriate: allergies, current medications, past family history, past medical history, past social history, past surgical history and problem list.     Allergies   Allergen Reactions   • Sulfa Antibiotics          Current Outpatient Medications:   •  atorvastatin (LIPITOR) 20 MG tablet, Take 1 tablet by mouth every night at bedtime., Disp: 90 tablet, Rfl: 1  •  calcium carbonate (TUMS) 500 MG chewable tablet, Chew., Disp: , Rfl:   •  CALCIUM PO, Take  by mouth., Disp: , Rfl:   •  famotidine (PEPCID) 40 MG tablet, Take 1 tablet by mouth At Night As Needed for Heartburn., Disp: 90 tablet, Rfl: 1  •  Fexofenadine HCl (ALLEGRA PO), Take  by mouth., Disp: , Rfl:   •  ipratropium (ATROVENT) 0.03 % nasal spray, 2 sprays into the nostril(s) as directed by provider 3 (Three) Times a Day As Needed for Rhinitis., Disp: 30 mL, Rfl: 2  •  VITAMIN D PO, Take  by mouth., Disp: , Rfl:   •  benzonatate (TESSALON) 200 MG capsule, Take 1 capsule by mouth 3 (Three) Times a Day As Needed for Cough., Disp: 30 capsule, Rfl: 1  •  montelukast (Singulair) 10 MG tablet, Take 1 tablet by mouth Every Night., Disp: 90 tablet, Rfl: 1    ROS    Review of Systems   Constitutional: Negative for chills and fever.   HENT: Positive for congestion and postnasal drip.    Respiratory: Positive for cough. Negative for shortness of breath and wheezing.    Cardiovascular: Negative for chest pain.       Vitals:    04/13/23 1043   BP: 124/62   Pulse: 68   Temp: 98 °F (36.7 °C)   SpO2: 98%   Weight: 62.6 kg (138 lb)   Height: 167.6 cm (66\")     Body mass index is 22.27 kg/m².    Physical Exam     Physical Exam  Constitutional:       General: She is not in acute distress.     Appearance: Normal appearance. She is well-developed.   HENT:      Head: Normocephalic and atraumatic.      Right Ear: Tympanic membrane and external ear normal.      Left Ear: Tympanic membrane and external ear normal.      Mouth/Throat:      Pharynx: Posterior " oropharyngeal erythema (PND) present.      Comments: Significant postnasal drip and oropharyngeal erythema.  Eyes:      Extraocular Movements: Extraocular movements intact.      Conjunctiva/sclera: Conjunctivae normal.   Cardiovascular:      Rate and Rhythm: Normal rate and regular rhythm.      Heart sounds: No murmur heard.  Pulmonary:      Effort: Pulmonary effort is normal. No respiratory distress.      Breath sounds: Normal breath sounds. No wheezing.   Abdominal:      General: Bowel sounds are normal. There is no distension.      Palpations: Abdomen is soft.      Tenderness: There is no abdominal tenderness.   Musculoskeletal:      Cervical back: Neck supple.      Right lower leg: No edema.      Left lower leg: No edema.   Lymphadenopathy:      Cervical: No cervical adenopathy.   Skin:     General: Skin is warm and dry.   Neurological:      Mental Status: She is alert and oriented to person, place, and time.      Cranial Nerves: No cranial nerve deficit.   Psychiatric:         Mood and Affect: Mood normal.         Behavior: Behavior normal.         Assessment/Plan    Diagnoses and all orders for this visit:    1. Persistent cough for 3 weeks or longer (Primary)  Assessment & Plan:  We will obtain chest x-ray for further evaluation. She has been encouraged to schedule an appointment with her allergy and asthma specialist      Orders:  -     XR Chest PA & Lateral    2. Gastroesophageal reflux disease with esophagitis, unspecified whether hemorrhage  Assessment & Plan:  Patient reports stable on current medications. She will continue Pepcid.      3. Seasonal allergic rhinitis, unspecified trigger  Assessment & Plan:  Possibly cause of persistent cough. We will add in Singulair to Allegra and Atrovent nasal spray.      Other orders  -     atorvastatin (LIPITOR) 20 MG tablet; Take 1 tablet by mouth every night at bedtime.  Dispense: 90 tablet; Refill: 1  -     famotidine (PEPCID) 40 MG tablet; Take 1 tablet by mouth  At Night As Needed for Heartburn.  Dispense: 90 tablet; Refill: 1  -     ipratropium (ATROVENT) 0.03 % nasal spray; 2 sprays into the nostril(s) as directed by provider 3 (Three) Times a Day As Needed for Rhinitis.  Dispense: 30 mL; Refill: 2  -     montelukast (Singulair) 10 MG tablet; Take 1 tablet by mouth Every Night.  Dispense: 90 tablet; Refill: 1  -     benzonatate (TESSALON) 200 MG capsule; Take 1 capsule by mouth 3 (Three) Times a Day As Needed for Cough.  Dispense: 30 capsule; Refill: 1      New Medications Ordered This Visit   Medications   • atorvastatin (LIPITOR) 20 MG tablet     Sig: Take 1 tablet by mouth every night at bedtime.     Dispense:  90 tablet     Refill:  1   • famotidine (PEPCID) 40 MG tablet     Sig: Take 1 tablet by mouth At Night As Needed for Heartburn.     Dispense:  90 tablet     Refill:  1   • ipratropium (ATROVENT) 0.03 % nasal spray     Si sprays into the nostril(s) as directed by provider 3 (Three) Times a Day As Needed for Rhinitis.     Dispense:  30 mL     Refill:  2   • montelukast (Singulair) 10 MG tablet     Sig: Take 1 tablet by mouth Every Night.     Dispense:  90 tablet     Refill:  1   • benzonatate (TESSALON) 200 MG capsule     Sig: Take 1 capsule by mouth 3 (Three) Times a Day As Needed for Cough.     Dispense:  30 capsule     Refill:  1       No orders of the defined types were placed in this encounter.      Return if symptoms worsen or fail to improve, for Next scheduled follow up.      Transcribed from ambient dictation for Cinthya Miles DO by Rufus Farris.  23   12:41 EDT    Patient or patient representative verbalized consent to the visit recording.  I have personally performed the services described in this document as transcribed by the above individual, and it is both accurate and complete.    Cinthya Miles DO

## 2023-04-13 NOTE — ASSESSMENT & PLAN NOTE
We will obtain chest x-ray for further evaluation. She has been encouraged to schedule an appointment with her allergy and asthma specialist

## 2023-09-20 ENCOUNTER — PATIENT MESSAGE (OUTPATIENT)
Dept: INTERNAL MEDICINE | Facility: CLINIC | Age: 63
End: 2023-09-20
Payer: COMMERCIAL

## 2023-09-20 DIAGNOSIS — M25.512 CHRONIC LEFT SHOULDER PAIN: Primary | ICD-10-CM

## 2023-09-20 DIAGNOSIS — G89.29 CHRONIC LEFT SHOULDER PAIN: Primary | ICD-10-CM

## 2023-09-20 DIAGNOSIS — Z12.31 SCREENING MAMMOGRAM FOR BREAST CANCER: ICD-10-CM

## 2023-09-20 NOTE — TELEPHONE ENCOUNTER
From: Marni Hussein  To: Cinthya Miles  Sent: 9/20/2023 12:08 PM EDT  Subject: Shoulder pain    I had physical therapy on my left shoulder last November through early January. My shoulder has flared up again and there’s additional pain not present before. I would like a referral to an [facility] in , in [place] if possible.  I would also like a referral for a mammogram downstairs in your building.

## 2023-10-12 ENCOUNTER — HOSPITAL ENCOUNTER (OUTPATIENT)
Dept: MAMMOGRAPHY | Facility: HOSPITAL | Age: 63
Discharge: HOME | End: 2023-10-12
Admitting: FAMILY MEDICINE
Payer: COMMERCIAL

## 2023-10-12 PROCEDURE — 77067 SCR MAMMO BI INCL CAD: CPT

## 2023-10-12 PROCEDURE — 77063 BREAST TOMOSYNTHESIS BI: CPT

## 2023-10-19 ENCOUNTER — OFFICE VISIT (OUTPATIENT)
Dept: INTERNAL MEDICINE | Facility: CLINIC | Age: 63
End: 2023-10-19
Payer: COMMERCIAL

## 2023-10-19 VITALS
DIASTOLIC BLOOD PRESSURE: 72 MMHG | WEIGHT: 138.5 LBS | HEIGHT: 66 IN | OXYGEN SATURATION: 97 % | BODY MASS INDEX: 22.26 KG/M2 | TEMPERATURE: 98.6 F | SYSTOLIC BLOOD PRESSURE: 124 MMHG | HEART RATE: 83 BPM

## 2023-10-19 DIAGNOSIS — E55.9 VITAMIN D DEFICIENCY: ICD-10-CM

## 2023-10-19 DIAGNOSIS — Z23 NEEDS FLU SHOT: ICD-10-CM

## 2023-10-19 DIAGNOSIS — K21.00 GASTROESOPHAGEAL REFLUX DISEASE WITH ESOPHAGITIS, UNSPECIFIED WHETHER HEMORRHAGE: ICD-10-CM

## 2023-10-19 DIAGNOSIS — E78.49 OTHER HYPERLIPIDEMIA: ICD-10-CM

## 2023-10-19 DIAGNOSIS — M25.512 ACUTE PAIN OF LEFT SHOULDER: ICD-10-CM

## 2023-10-19 DIAGNOSIS — Z13.0 SCREENING FOR BLOOD DISEASE: ICD-10-CM

## 2023-10-19 DIAGNOSIS — Z13.29 SCREENING FOR ENDOCRINE DISORDER: ICD-10-CM

## 2023-10-19 DIAGNOSIS — Z00.00 WELL ADULT EXAM: Primary | ICD-10-CM

## 2023-10-19 DIAGNOSIS — J30.2 SEASONAL ALLERGIC RHINITIS, UNSPECIFIED TRIGGER: ICD-10-CM

## 2023-10-19 RX ORDER — FAMOTIDINE 40 MG/1
40 TABLET, FILM COATED ORAL NIGHTLY PRN
Qty: 90 TABLET | Refills: 3 | Status: SHIPPED | OUTPATIENT
Start: 2023-10-19

## 2023-10-19 RX ORDER — ATORVASTATIN CALCIUM 20 MG/1
20 TABLET, FILM COATED ORAL
Qty: 90 TABLET | Refills: 3 | Status: SHIPPED | OUTPATIENT
Start: 2023-10-19

## 2023-10-19 NOTE — ASSESSMENT & PLAN NOTE
Improved with Allegra. Patient will continue Allegra on a daily basis. She will continue ipratropium and Singulair as needed.

## 2023-10-19 NOTE — PROGRESS NOTES
Marni Hussein is a 63 y.o. female.    Chief Complaint   Patient presents with    Annual Exam       HPI   Marni Hussein is a 63-year-old female who presents today for an annual physical exam. She is also following up on hyperlipidemia, gastroesophageal reflux disease, and allergic rhinitis.    The patient states that she has a mostly healthy diet. She is active. She is up to date on her dental and eye exams. Her most recent colonoscopy was completed on 05/12/2022. She regularly obtains a Pap smear every year. Her last Pap smear was completed in 2022 by her OBGYN, Dr. Angel Swanson. She completed a DEXA scan within the last year. She is not taking vitamin D at this time. She is up to date on her tetanus vaccine, which was administered in 2015. She has obtained a few COVID-19 vaccines. She denies receiving a herpes zoster vaccine in the past. She is not sure if she experienced varicella during her childhood. She states that she is scheduled to see dermatology on 01/2024.    Patient has had hyperlipidemia for few years. She has been compliant with a low-fat diet. She is taking Lipitor and has been compliant with taking the medications, without side effects. Her weight is within normal range. She is active, and regularly exercises.    The patient recently had an episode of heartburn. She confirms that she continues to take Pepcid to treat her symptoms. She states that she takes Tums as needed.    Patient complains of left shoulder pain, which has been worsening over the last few months. She is able to raise her left upper extremity; however, she occasionally experiences pain when grabbing an object. She has experienced issues with her left shoulder for several years. She reports undergoing physical therapy on 01/2023 and notes that it helped. She will be seen at [facility]. She is currently applying a muscle rub to the affected area. She denies taking Aleve or ibuprofen to treat her  symptoms.    The patient states that she suffered a sinus headache this morning, 10/19/2023. She regularly takes Allegra but states that she has not taken it in 4 days. She is using a nasal spray. She is using ipratropium as needed. She is not taking Singulair at this time. She was scheduled to see an allergist during the summer for evaluation of a cough; however, she states that her symptoms resolved and that she was not seen.    The following portions of the patient's history were reviewed and updated as appropriate: allergies, current medications, past family history, past medical history, past social history, past surgical history and problem list.     Past Medical History:   Diagnosis Date    Abnormal Pap smear of cervix     Acute appendicitis with localized peritonitis 09/23/2017    Added automatically from request for surgery [id number]    Allergic     Anemia     Breast injury     bruising as a teenager    Cataract     Cervical dysplasia     Colon polyp     Depression     Endometriosis     Fibrocystic breast     GERD (gastroesophageal reflux disease)     HPV (human papilloma virus) infection     Hyperlipidemia     Osteopenia     Ovarian cyst     Peptic ulceration     Urinary tract infection        Past Surgical History:   Procedure Laterality Date    APPENDECTOMY N/A 09/23/2017    Procedure: APPENDECTOMY LAPAROSCOPIC;  Surgeon: Etienne Branham MD;  Location: [facility];  Service:     BREAST CYST ASPIRATION Left     BREAST CYST EXCISION  1980    BREAST EXCISIONAL BIOPSY Right     1982    COLPOSCOPY      in her 30's    CRYOTHERAPY      abnormal pap smear in her 30's    DIAGNOSTIC LAPAROSCOPY      age 42, endo    PELVIC LAPAROSCOPY  2002       Family History   Problem Relation Age of Onset    Breast cancer Mother 54    Cancer Mother     Hypertension Mother     ANTOLIN disease Mother     Hyperlipidemia Mother     Heart disease Mother         mild CAD    Asthma Mother     Hypertension Father     Hyperlipidemia Father      Hypertension Sister     Hyperlipidemia Sister     Fibromyalgia Sister     Hypertension Brother     Hyperlipidemia Brother     Diabetes Brother     Hypertension Maternal Aunt     Hyperlipidemia Maternal Aunt     Cancer Maternal Aunt     Hypertension Maternal Uncle     Heart disease Maternal Uncle     Hypertension Paternal Aunt     Hypertension Paternal Uncle     Hyperlipidemia Paternal Uncle     Heart disease Paternal Uncle         pacemaker    Stroke Maternal Grandmother     Heart disease Maternal Grandmother     Hyperlipidemia Maternal Grandmother     Hypertension Maternal Grandmother     Heart disease Maternal Grandfather     Hyperlipidemia Maternal Grandfather     Hypertension Maternal Grandfather     Heart attack Maternal Grandfather     Tuberculosis Paternal Grandmother     Hypertension Paternal Grandfather     Hyperlipidemia Paternal Grandfather     Heart disease Paternal Grandfather     Stroke Paternal Grandfather     Diabetes Paternal Grandfather     Brain cancer Maternal Aunt     Stroke Maternal Aunt     Hypertension Maternal Aunt     Hyperlipidemia Maternal Aunt     Heart disease Maternal Aunt     Hypertension Maternal Uncle     Hyperlipidemia Maternal Uncle     Heart disease Maternal Uncle     Heart attack Maternal Uncle     Stroke Maternal Uncle     Hypertension Paternal Uncle     Hyperlipidemia Paternal Uncle     Heart disease Paternal Uncle     Heart attack Paternal Uncle     Breast cancer Cousin     Ovarian cancer Neg Hx     Endometrial cancer Neg Hx     Uterine cancer Neg Hx     Colon cancer Neg Hx        Social History     Socioeconomic History    Marital status:    Tobacco Use    Smoking status: Never    Smokeless tobacco: Never   Vaping Use    Vaping Use: Never used   Substance and Sexual Activity    Alcohol use: Yes     Comment: Rare occasions    Drug use: No    Sexual activity: Not Currently     Partners: Male     Birth control/protection: Post-menopausal       Allergies   Allergen  Reactions    Sulfa Antibiotics          Current Outpatient Medications:     atorvastatin (LIPITOR) 20 MG tablet, Take 1 tablet by mouth every night at bedtime., Disp: 90 tablet, Rfl: 3    calcium carbonate (TUMS) 500 MG chewable tablet, Chew., Disp: , Rfl:     CALCIUM PO, Take  by mouth., Disp: , Rfl:     famotidine (PEPCID) 40 MG tablet, Take 1 tablet by mouth At Night As Needed for Heartburn., Disp: 90 tablet, Rfl: 3    Fexofenadine HCl (ALLEGRA PO), Take  by mouth., Disp: , Rfl:     ipratropium (ATROVENT) 0.03 % nasal spray, 2 sprays into the nostril(s) as directed by provider 3 (Three) Times a Day As Needed for Rhinitis., Disp: 30 mL, Rfl: 2    montelukast (Singulair) 10 MG tablet, Take 1 tablet by mouth Every Night., Disp: 90 tablet, Rfl: 1    VITAMIN D PO, Take  by mouth., Disp: , Rfl:     ROS    Review of Systems   Constitutional:  Negative for chills, fatigue and fever.   HENT:  Positive for postnasal drip (trace). Negative for congestion and sore throat.    Eyes:  Negative for blurred vision, itching and visual disturbance.   Respiratory:  Negative for cough, shortness of breath and wheezing.    Cardiovascular:  Negative for chest pain and leg swelling.   Gastrointestinal:  Negative for abdominal pain, constipation, diarrhea, nausea and vomiting.   Endocrine: Negative for cold intolerance and heat intolerance.   Genitourinary:  Negative for difficulty urinating, dysuria and frequency.   Musculoskeletal:  Positive for arthralgias. Negative for back pain.   Skin:  Negative for color change and rash.   Allergic/Immunologic: Positive for environmental allergies.   Neurological:  Negative for weakness, numbness and headache.   Hematological:  Does not bruise/bleed easily.   Psychiatric/Behavioral:  Negative for depressed mood. The patient is not nervous/anxious.        Vitals:    10/19/23 1435   BP: 124/72   BP Location: Right arm   Patient Position: Sitting   Cuff Size: Adult   Pulse: 83   Temp: 98.6 °F (37 °C)  "  TempSrc: Temporal   SpO2: 97%   Weight: 62.8 kg (138 lb 8 oz)   Height: 167.6 cm (66\")   PainSc: 0-No pain     Body mass index is 22.35 kg/m².    Physical Exam     Physical Exam  Constitutional:       General: She is not in acute distress.     Appearance: Normal appearance. She is well-developed and normal weight.   HENT:      Head: Normocephalic and atraumatic.      Right Ear: Tympanic membrane and external ear normal.      Left Ear: Tympanic membrane and external ear normal.      Nose: Nose normal.      Mouth/Throat:      Pharynx: Posterior oropharyngeal erythema present.   Eyes:      Extraocular Movements: Extraocular movements intact.      Conjunctiva/sclera: Conjunctivae normal.      Pupils: Pupils are equal, round, and reactive to light.   Cardiovascular:      Rate and Rhythm: Normal rate and regular rhythm.      Pulses: Normal pulses.      Heart sounds: Normal heart sounds. No murmur heard.  Pulmonary:      Effort: Pulmonary effort is normal. No respiratory distress.      Breath sounds: Normal breath sounds. No wheezing.   Abdominal:      General: Bowel sounds are normal. There is no distension.      Palpations: Abdomen is soft.      Tenderness: There is no abdominal tenderness.   Musculoskeletal:      Cervical back: Neck supple.      Right lower leg: No edema.      Left lower leg: No edema.   Lymphadenopathy:      Cervical: No cervical adenopathy.   Skin:     General: Skin is warm and dry.   Neurological:      Mental Status: She is alert and oriented to person, place, and time.      Cranial Nerves: No cranial nerve deficit.      Deep Tendon Reflexes: Reflexes normal.   Psychiatric:         Mood and Affect: Mood normal.         Behavior: Behavior normal.         Thought Content: Thought content normal.         Assessment/Plan    Diagnoses and all orders for this visit:    1. Well adult exam (Primary)  Assessment & Plan:  Discussed with patient routine health maintenance including vaccines, dental/eye " health, healthy diet and exercise, colorectal cancer screening, Pap smears, bone density tests and mammograms. Mental health has been addressed today as well. Routine labs have been ordered. Flu vaccine has been administered.    Orders:  -     Cancel: CBC & Differential  -     Cancel: Comprehensive Metabolic Panel  -     CBC & Differential  -     Comprehensive Metabolic Panel  -     Lipid Panel    2. Needs flu shot    3. Other hyperlipidemia  Assessment & Plan:  We will obtain updated lipid panel. She will continue Lipitor.    Orders:  -     Cancel: Lipid Panel  -     Lipid Panel    4. Gastroesophageal reflux disease with esophagitis, unspecified whether hemorrhage  Assessment & Plan:  Improved with current medication. She will continue Pepcid and p.r.n. Tums.      5. Acute pain of left shoulder  Assessment & Plan:  Patient does have an appointment scheduled with orthopedics for next week.      6. Screening for blood disease  -     Cancel: CBC & Differential  -     CBC & Differential    7. Screening for endocrine disorder  -     Cancel: Comprehensive Metabolic Panel  -     Comprehensive Metabolic Panel    8. Vitamin D deficiency  -     Vitamin D,25-Hydroxy    9. Seasonal allergic rhinitis, unspecified trigger  Assessment & Plan:  Improved with Allegra. Patient will continue Allegra on a daily basis. She will continue ipratropium and Singulair as needed.      Other orders  -     Fluzone (or Fluarix & Flulaval for VFC) >6mos  -     atorvastatin (LIPITOR) 20 MG tablet; Take 1 tablet by mouth every night at bedtime.  Dispense: 90 tablet; Refill: 3  -     famotidine (PEPCID) 40 MG tablet; Take 1 tablet by mouth At Night As Needed for Heartburn.  Dispense: 90 tablet; Refill: 3        New Medications Ordered This Visit   Medications    atorvastatin (LIPITOR) 20 MG tablet     Sig: Take 1 tablet by mouth every night at bedtime.     Dispense:  90 tablet     Refill:  3    famotidine (PEPCID) 40 MG tablet     Sig: Take 1 tablet  by mouth At Night As Needed for Heartburn.     Dispense:  90 tablet     Refill:  3       Orders Placed This Encounter   Procedures    Fluzone (or Fluarix & Flulaval for VFC) >6mos       Return in about 1 year (around 10/19/2024) for Annual.    Transcribed from ambient dictation for Cinthya Miles DO by Kristie Dubon.  10/19/23   18:20 EDT    Patient or patient representative verbalized consent to the visit recording.  I have personally performed the services described in this document as transcribed by the above individual, and it is both accurate and complete.  Cinthya Miles DO  10/19/2023  21:19 EDT    Cinthya Miles DO

## 2023-10-24 LAB
25(OH)D3+25(OH)D2 SERPL-MCNC: 41.5 NG/ML (ref 30–100)
ALBUMIN SERPL-MCNC: 4.4 G/DL (ref 3.5–5.2)
ALBUMIN/GLOB SERPL: 2.3 G/DL
ALP SERPL-CCNC: 99 U/L (ref 39–117)
ALT SERPL-CCNC: 17 U/L (ref 1–33)
AST SERPL-CCNC: 18 U/L (ref 1–32)
BASOPHILS # BLD AUTO: 0.06 10*3/MM3 (ref 0–0.2)
BASOPHILS NFR BLD AUTO: 1 % (ref 0–1.5)
BILIRUB SERPL-MCNC: 0.4 MG/DL (ref 0–1.2)
BUN SERPL-MCNC: 11 MG/DL (ref 8–23)
BUN/CREAT SERPL: 13.8 (ref 7–25)
CALCIUM SERPL-MCNC: 9.6 MG/DL (ref 8.6–10.5)
CHLORIDE SERPL-SCNC: 106 MMOL/L (ref 98–107)
CHOLEST SERPL-MCNC: 174 MG/DL (ref 0–200)
CO2 SERPL-SCNC: 28.7 MMOL/L (ref 22–29)
CREAT SERPL-MCNC: 0.8 MG/DL (ref 0.57–1)
EGFRCR SERPLBLD CKD-EPI 2021: 82.9 ML/MIN/1.73
EOSINOPHIL # BLD AUTO: 0.08 10*3/MM3 (ref 0–0.4)
EOSINOPHIL NFR BLD AUTO: 1.3 % (ref 0.3–6.2)
ERYTHROCYTE [DISTWIDTH] IN BLOOD BY AUTOMATED COUNT: 12.1 % (ref 12.3–15.4)
GLOBULIN SER CALC-MCNC: 1.9 GM/DL
GLUCOSE SERPL-MCNC: 86 MG/DL (ref 65–99)
HCT VFR BLD AUTO: 41.4 % (ref 34–46.6)
HDLC SERPL-MCNC: 69 MG/DL (ref 40–60)
HGB BLD-MCNC: 13.8 G/DL (ref 12–15.9)
IMM GRANULOCYTES # BLD AUTO: 0 10*3/MM3 (ref 0–0.05)
IMM GRANULOCYTES NFR BLD AUTO: 0 % (ref 0–0.5)
LDLC SERPL CALC-MCNC: 88 MG/DL (ref 0–100)
LYMPHOCYTES # BLD AUTO: 2.96 10*3/MM3 (ref 0.7–3.1)
LYMPHOCYTES NFR BLD AUTO: 49.4 % (ref 19.6–45.3)
MCH RBC QN AUTO: 30.4 PG (ref 26.6–33)
MCHC RBC AUTO-ENTMCNC: 33.3 G/DL (ref 31.5–35.7)
MCV RBC AUTO: 91.2 FL (ref 79–97)
MONOCYTES # BLD AUTO: 0.5 10*3/MM3 (ref 0.1–0.9)
MONOCYTES NFR BLD AUTO: 8.3 % (ref 5–12)
NEUTROPHILS # BLD AUTO: 2.39 10*3/MM3 (ref 1.7–7)
NEUTROPHILS NFR BLD AUTO: 40 % (ref 42.7–76)
NRBC BLD AUTO-RTO: 0 /100 WBC (ref 0–0.2)
PLATELET # BLD AUTO: 248 10*3/MM3 (ref 140–450)
POTASSIUM SERPL-SCNC: 4.6 MMOL/L (ref 3.5–5.2)
PROT SERPL-MCNC: 6.3 G/DL (ref 6–8.5)
RBC # BLD AUTO: 4.54 10*6/MM3 (ref 3.77–5.28)
SODIUM SERPL-SCNC: 144 MMOL/L (ref 136–145)
TRIGL SERPL-MCNC: 92 MG/DL (ref 0–150)
VLDLC SERPL CALC-MCNC: 17 MG/DL (ref 5–40)
WBC # BLD AUTO: 5.99 10*3/MM3 (ref 3.4–10.8)

## 2024-01-04 ENCOUNTER — OFFICE VISIT (OUTPATIENT)
Dept: OBSTETRICS AND GYNECOLOGY | Facility: CLINIC | Age: 64
End: 2024-01-04
Payer: COMMERCIAL

## 2024-01-04 VITALS
DIASTOLIC BLOOD PRESSURE: 70 MMHG | BODY MASS INDEX: 22.56 KG/M2 | WEIGHT: 140.4 LBS | HEIGHT: 66 IN | SYSTOLIC BLOOD PRESSURE: 122 MMHG

## 2024-01-04 DIAGNOSIS — Z01.419 WOMEN'S ANNUAL ROUTINE GYNECOLOGICAL EXAMINATION: Primary | ICD-10-CM

## 2024-01-04 PROCEDURE — 99396 PREV VISIT EST AGE 40-64: CPT | Performed by: OBSTETRICS & GYNECOLOGY

## 2024-01-04 NOTE — PROGRESS NOTES
Gynecologic Annual Exam Note        GYN Annual Exam     CC - Here for annual exam.        ISSA  Marni Hussein is a 63 y.o. female, , who presents for annual well woman exam as a established patient.  She is postmenopausal. Denies vaginal bleeding.  Patient reports problems with: occasional hot flashes. There were no changes to her medical or surgical history since her last visit.. Partner Status: Marital Status: .  She is is sexually active. She has not had new partners.. STD testing recommendations have been explained to the patient and she does not desire STD testing.    Additional OB/GYN History   On HRT? No    Last Pap : 2022. Results: negative. HPV: unknown .   Last Completed Pap Smear            PAP SMEAR (Every 5 Years) Order placed this encounter      2022  LIQUID-BASED PAP SMEAR, [facility] ([facility])    2021  SCANNED - PAP SMEAR    2020  Pap IG, Rfx HPV ASCU    06/15/2016  Done                  History of abnormal Pap smear: yes - 30 years ago- Colpo and cryotherapy  Family history of uterine, colon, breast, or ovarian cancer: yes - Breast Ca-mother and maternal cousin; Colon ca-maternal great uncle  Performs monthly Self-Breast Exam: yes  Last mammogram: 10/12/2023. Done at . [facility]    Last Completed Mammogram            MAMMOGRAM (Yearly) Next due on 10/12/2024      10/12/2023  Mammo Screening Digital Tomosynthesis Bilateral With CAD    10/11/2022  Mammo Screening Digital Tomosynthesis Bilateral With CAD    10/18/2021  Mammo Screening Digital Tomosynthesis Bilateral With CAD    10/15/2020  Mammo Screening Digital Tomosynthesis Bilateral With CAD    2019  Mammo Screening Digital Tomosynthesis Bilateral With CAD    Only the first 5 history entries have been loaded, but more history exists.                  Last colonoscopy: has had a colonoscopy 2 year(s) ago. Polyp- [facility]    Last Completed Colonoscopy            COLORECTAL CANCER SCREENING (COLONOSCOPY -  Every 5 Years) Next due on 2022  SCANNED - COLONOSCOPY    2022  Fecal Occult Blood component of POC Occult Blood Stool    2022  Fecal Occult Blood component of POC Occult Blood Stool    2022  Fecal Occult Blood component of POC Occult Blood Stool    2016  SCANNED - COLONOSCOPY    Only the first 5 history entries have been loaded, but more history exists.                      Last bone density scan (DEXA): On 2022 and results were Osteopenia  Exercises Regularly: no  Feelings of Anxiety or Depression: yes - occasional anxiety- well controlled      Tobacco Usage?: No       Current Outpatient Medications:     atorvastatin (LIPITOR) 20 MG tablet, Take 1 tablet by mouth every night at bedtime., Disp: 90 tablet, Rfl: 3    calcium carbonate (TUMS) 500 MG chewable tablet, Chew., Disp: , Rfl:     CALCIUM PO, Take  by mouth., Disp: , Rfl:     famotidine (PEPCID) 40 MG tablet, Take 1 tablet by mouth At Night As Needed for Heartburn., Disp: 90 tablet, Rfl: 3    Fexofenadine HCl (ALLEGRA PO), Take  by mouth., Disp: , Rfl:     ipratropium (ATROVENT) 0.03 % nasal spray, 2 sprays into the nostril(s) as directed by provider 3 (Three) Times a Day As Needed for Rhinitis., Disp: 30 mL, Rfl: 2    montelukast (Singulair) 10 MG tablet, Take 1 tablet by mouth Every Night., Disp: 90 tablet, Rfl: 1    VITAMIN D PO, Take  by mouth., Disp: , Rfl:     Patient denies the need for medication refills today.    OB History          0    Para   0    Term   0       0    AB   0    Living   0         SAB   0    IAB   0    Ectopic   0    Molar        Multiple   0    Live Births   0                Past Medical History:   Diagnosis Date    Abnormal Pap smear of cervix     Acute appendicitis with localized peritonitis 2017    Added automatically from request for surgery [id number]    Allergic     Anemia     Breast injury     bruising as a teenager    Cataract     Cervical dysplasia     Colon  polyp     Depression     Endometriosis     Fibrocystic breast     GERD (gastroesophageal reflux disease)     HPV (human papilloma virus) infection     Hyperlipidemia     Osteopenia     Ovarian cyst     Peptic ulceration     Urinary tract infection         Past Surgical History:   Procedure Laterality Date    APPENDECTOMY N/A 09/23/2017    Procedure: APPENDECTOMY LAPAROSCOPIC;  Surgeon: Etienne Branham MD;  Location: [facility];  Service:     BREAST CYST ASPIRATION Left     BREAST CYST EXCISION  1980    BREAST EXCISIONAL BIOPSY Right     1982    COLPOSCOPY      in her 30's    CRYOTHERAPY      abnormal pap smear in her 30's    DIAGNOSTIC LAPAROSCOPY      age 42, endo    PELVIC LAPAROSCOPY  2002       Health Maintenance   Topic Date Due    DXA SCAN  Never done    ZOSTER VACCINE (1 of 2) Never done    MAMMOGRAM  10/12/2024    ANNUAL PHYSICAL  10/19/2024    LIPID PANEL  10/24/2024    TDAP/TD VACCINES (2 - Td or Tdap) 07/01/2025    COLORECTAL CANCER SCREENING  05/12/2027    PAP SMEAR  12/29/2027    COVID-19 Vaccine  Completed    INFLUENZA VACCINE  Completed    Pneumococcal Vaccine 0-64  Aged Out    HEPATITIS C SCREENING  Discontinued    Annual Gynecologic Pelvic and Breast Exam  Discontinued       The additional following portions of the patient's history were reviewed and updated as appropriate: allergies, current medications, past family history, past medical history, past social history, past surgical history, and problem list.    Review of Systems   Constitutional: Negative.    HENT: Negative.     Eyes: Negative.    Respiratory: Negative.     Cardiovascular: Negative.    Gastrointestinal: Negative.    Endocrine: Negative.    Genitourinary: Negative.    Musculoskeletal: Negative.    Skin: Negative.    Allergic/Immunologic: Negative.    Neurological: Negative.    Hematological: Negative.    Psychiatric/Behavioral: Negative.         I have reviewed and agree with the HPI, ROS, and historical information as entered above.  "Angel Swanson MD      Objective   /70   Ht 167.6 cm (66\")   Wt 63.7 kg (140 lb 6.4 oz)   BMI 22.66 kg/m²     Physical Exam  Vitals and nursing note reviewed. Exam conducted with a chaperone present.   Constitutional:       Appearance: She is well-developed.   HENT:      Head: Normocephalic and atraumatic.   Neck:      Thyroid: No thyroid mass or thyromegaly.   Cardiovascular:      Rate and Rhythm: Normal rate and regular rhythm.      Heart sounds: No murmur heard.  Pulmonary:      Effort: Pulmonary effort is normal. No retractions.      Breath sounds: Normal breath sounds. No wheezing, rhonchi or rales.   Chest:      Chest wall: No mass or tenderness.   Breasts:     Right: Normal. No mass, nipple discharge, skin change or tenderness.      Left: Normal. No mass, nipple discharge, skin change or tenderness.   Abdominal:      General: Bowel sounds are normal.      Palpations: Abdomen is soft. Abdomen is not rigid. There is no mass.      Tenderness: There is no abdominal tenderness. There is no guarding.      Hernia: No hernia is present. There is no hernia in the left inguinal area.   Genitourinary:     Labia:         Right: No rash, tenderness or lesion.         Left: No rash, tenderness or lesion.       Vagina: Normal. No vaginal discharge or lesions.      Cervix: No cervical motion tenderness, discharge, lesion or cervical bleeding.      Uterus: Normal. Not enlarged, not fixed and not tender.       Adnexa:         Right: No mass or tenderness.          Left: No mass or tenderness.        Rectum: No external hemorrhoid.   Musculoskeletal:      Cervical back: Normal range of motion. No muscular tenderness.   Neurological:      Mental Status: She is alert and oriented to person, place, and time.   Psychiatric:         Behavior: Behavior normal.            Assessment and Plan    Problem List Items Addressed This Visit    None  Visit Diagnoses       Women's annual routine gynecological examination    -  Primary "            GYN annual well woman exam.   Reviewed monthly self breast exams.  Instructed to call with lumps, pain, or breast discharge.  Yearly mammograms ordered.  Ordered mammogram today.  Recommended use of Vitamin D and getting adequate calcium in her diet. (1500mg)  Colonoscopy recommended.  RTC in 1 year or PRN with problems.  Return in about 1 year (around 1/4/2025) for Annual physical.         Angel Swanson MD  01/04/2024

## 2024-01-09 LAB — REF LAB TEST METHOD: NORMAL

## 2024-04-29 ENCOUNTER — OFFICE VISIT (OUTPATIENT)
Age: 64
End: 2024-04-29
Payer: COMMERCIAL

## 2024-04-29 VITALS
HEIGHT: 66 IN | SYSTOLIC BLOOD PRESSURE: 106 MMHG | TEMPERATURE: 98.5 F | OXYGEN SATURATION: 96 % | HEART RATE: 65 BPM | BODY MASS INDEX: 22.37 KG/M2 | DIASTOLIC BLOOD PRESSURE: 78 MMHG | WEIGHT: 139.2 LBS

## 2024-04-29 DIAGNOSIS — J01.10 ACUTE NON-RECURRENT FRONTAL SINUSITIS: Primary | ICD-10-CM

## 2024-04-29 PROCEDURE — 99213 OFFICE O/P EST LOW 20 MIN: CPT | Performed by: FAMILY MEDICINE

## 2024-04-29 RX ORDER — AMOXICILLIN AND CLAVULANATE POTASSIUM 875; 125 MG/1; MG/1
1 TABLET, FILM COATED ORAL 2 TIMES DAILY
Qty: 14 TABLET | Refills: 0 | Status: SHIPPED | OUTPATIENT
Start: 2024-04-29

## 2024-04-29 RX ORDER — PREDNISONE 5 MG/1
1 TABLET ORAL TAKE AS DIRECTED
Qty: 21 TABLET | Refills: 0 | Status: SHIPPED | OUTPATIENT
Start: 2024-04-29

## 2024-04-29 NOTE — PROGRESS NOTES
Follow Up Office Visit      Date: 2024   Patient Name: Marni Hussein  : 1960   MRN: [id number]     Chief Complaint:    Chief Complaint   Patient presents with    Earache     Right sided head pressure and right fullness  Started around        History of Present Illness: Marni Hussein is a 63 y.o. female who is here today for congestion.  States that she has been having pressure on the right side of her forehead going down toward ear and jaw.  States that it is a tingling in nature.    Subjective      Review of Systems:   Review of Systems   Constitutional:  Negative for chills and fever.   HENT:  Positive for congestion.    Eyes:  Negative for discharge.   Respiratory:  Negative for cough.    Cardiovascular:  Negative for chest pain.   Gastrointestinal:  Negative for nausea and vomiting.       I have reviewed the patients family history, social history, past medical history, past surgical history and have updated it as appropriate.     Medications:     Current Outpatient Medications:     atorvastatin (LIPITOR) 20 MG tablet, Take 1 tablet by mouth every night at bedtime., Disp: 90 tablet, Rfl: 3    calcium carbonate (TUMS) 500 MG chewable tablet, Chew., Disp: , Rfl:     CALCIUM PO, Take  by mouth., Disp: , Rfl:     famotidine (PEPCID) 40 MG tablet, Take 1 tablet by mouth At Night As Needed for Heartburn., Disp: 90 tablet, Rfl: 3    Fexofenadine HCl (ALLEGRA PO), Take  by mouth., Disp: , Rfl:     ipratropium (ATROVENT) 0.03 % nasal spray, 2 sprays into the nostril(s) as directed by provider 3 (Three) Times a Day As Needed for Rhinitis., Disp: 30 mL, Rfl: 2    montelukast (Singulair) 10 MG tablet, Take 1 tablet by mouth Every Night., Disp: 90 tablet, Rfl: 1    VITAMIN D PO, Take  by mouth., Disp: , Rfl:     amoxicillin-clavulanate (AUGMENTIN) 875-125 MG per tablet, Take 1 tablet by mouth 2 (Two) Times a Day., Disp: 14 tablet, Rfl: 0    predniSONE 5 MG (21) tablet therapy pack dose pack, Take 1  "tablet by mouth Take As Directed. Take as directed on package instructions., Disp: 21 tablet, Rfl: 0    Allergies:   Allergies   Allergen Reactions    Sulfa Antibiotics        Objective     Physical Exam: Please see above  Vital Signs:   Vitals:    24 1313   BP: 106/78   Pulse: 65   Temp: 98.5 °F (36.9 °C)   SpO2: 96%   Weight: 63.1 kg (139 lb 3.2 oz)   Height: 167.6 cm (66\")     Body mass index is 22.47 kg/m².    Physical Exam  Constitutional:       Appearance: Normal appearance.   HENT:      Head: Normocephalic.      Right Ear: A middle ear effusion is present.      Left Ear: A middle ear effusion is present.   Cardiovascular:      Rate and Rhythm: Normal rate and regular rhythm.   Pulmonary:      Effort: Pulmonary effort is normal.      Breath sounds: Normal breath sounds.   Neurological:      Mental Status: She is alert.         Procedures    Results:   Labs:   No results found for: \"HGBA1C\", \"CMP\", \"CBCDIFFPANEL\", \"CREAT\", \"TSH\"     POCT Results (if applicable):   Results for orders placed or performed in visit on 24   LIQUID-BASED PAP SMEAR WITH HPV GENOTYPING IF ASCUS ([facility])    Specimen: Cervix; ThinPrep Vial   Result Value Ref Range    Reference Lab Report       [facility]  [address] [place] [address]  Phone: [phone] or [phone]  Fax: [phone]  Ronnie Lopez M.D., Medical Director    PATIENT NAME                                     LABORATORY NO[address]  [id number]   KASSIDY CHAMORRORobert                               [id number]  [id number]                                 AGE                    SEX   SSN              CLIENT REF #  [id number]                                 63        1960      F     [id number]      [id number]    [id number]                 REQUESTING M.D.           ATTENDING [place] [address]                        CLAUDIA FERREIRA  DATE COLLECTED            DATE RECEIVED          DATE " REPORTED  01/04/2024 01/04/2024 01/09/2024    ThinPrep Pap with Cytyc Imaging    DIAGNOSIS:  Negative for intraepithelial lesion or malignancy    Multiple factors can influence accuracy of Pap tests; therefore, screening at  regular  intervals is necessary for early cancer detection.    COMMENT:     Benign cellular changes associated with atrophy are present.    SPECIMEN ADEQUACY:  SATISFACTORY FOR EVALUATION  Transformation zone is present.  SOURCE OF SPECIMEN:       CERVICAL/ENDOCERVICAL  SLIDES:  1  CLINICAL HISTORY:  Women's annual routine gynecological examination  Post Menopausal      CYTOTECHNOLOGIST:             GORAN MCFARLAND (ASCP)    CPT CODES:  52227         Imaging:   No valid procedures specified.         Assessment / Plan      Assessment/Plan:   Diagnoses and all orders for this visit:    1. Acute non-recurrent frontal sinusitis (Primary)  -     amoxicillin-clavulanate (AUGMENTIN) 875-125 MG per tablet; Take 1 tablet by mouth 2 (Two) Times a Day.  Dispense: 14 tablet; Refill: 0  -     predniSONE 5 MG (21) tablet therapy pack dose pack; Take 1 tablet by mouth Take As Directed. Take as directed on package instructions.  Dispense: 21 tablet; Refill: 0        BMI is within normal parameters. No other follow-up for BMI required.      Vaccine Counseling:      Follow Up:   No follow-ups on file.      Chino Isidro,    [facility]

## 2024-08-19 ENCOUNTER — TRANSCRIBE ORDERS (OUTPATIENT)
Dept: ADMINISTRATIVE | Facility: HOSPITAL | Age: 64
End: 2024-08-19
Payer: COMMERCIAL

## 2024-08-19 DIAGNOSIS — Z12.31 VISIT FOR SCREENING MAMMOGRAM: Primary | ICD-10-CM

## 2024-10-17 ENCOUNTER — HOSPITAL ENCOUNTER (OUTPATIENT)
Dept: MAMMOGRAPHY | Facility: HOSPITAL | Age: 64
Discharge: HOME | End: 2024-10-17
Admitting: FAMILY MEDICINE
Payer: COMMERCIAL

## 2024-10-17 DIAGNOSIS — Z12.31 VISIT FOR SCREENING MAMMOGRAM: ICD-10-CM

## 2024-10-17 PROCEDURE — 77067 SCR MAMMO BI INCL CAD: CPT

## 2024-10-17 PROCEDURE — 77063 BREAST TOMOSYNTHESIS BI: CPT

## 2024-10-21 ENCOUNTER — OFFICE VISIT (OUTPATIENT)
Dept: INTERNAL MEDICINE | Facility: CLINIC | Age: 64
End: 2024-10-21
Payer: COMMERCIAL

## 2024-10-21 VITALS
HEIGHT: 66 IN | OXYGEN SATURATION: 97 % | DIASTOLIC BLOOD PRESSURE: 76 MMHG | WEIGHT: 140 LBS | HEART RATE: 66 BPM | SYSTOLIC BLOOD PRESSURE: 116 MMHG | BODY MASS INDEX: 22.5 KG/M2 | TEMPERATURE: 98 F

## 2024-10-21 DIAGNOSIS — Z13.29 SCREENING FOR ENDOCRINE DISORDER: ICD-10-CM

## 2024-10-21 DIAGNOSIS — E55.9 VITAMIN D DEFICIENCY: ICD-10-CM

## 2024-10-21 DIAGNOSIS — J30.2 SEASONAL ALLERGIC RHINITIS, UNSPECIFIED TRIGGER: ICD-10-CM

## 2024-10-21 DIAGNOSIS — M85.89 OSTEOPENIA OF MULTIPLE SITES: ICD-10-CM

## 2024-10-21 DIAGNOSIS — E78.49 OTHER HYPERLIPIDEMIA: ICD-10-CM

## 2024-10-21 DIAGNOSIS — Z23 NEED FOR INFLUENZA VACCINATION: ICD-10-CM

## 2024-10-21 DIAGNOSIS — Z13.0 SCREENING FOR BLOOD DISEASE: ICD-10-CM

## 2024-10-21 DIAGNOSIS — K21.00 GASTROESOPHAGEAL REFLUX DISEASE WITH ESOPHAGITIS, UNSPECIFIED WHETHER HEMORRHAGE: ICD-10-CM

## 2024-10-21 DIAGNOSIS — Z00.00 WELL ADULT EXAM: Primary | ICD-10-CM

## 2024-10-21 PROBLEM — M25.512 ACUTE PAIN OF LEFT SHOULDER: Status: RESOLVED | Noted: 2021-10-04 | Resolved: 2024-10-21

## 2024-10-21 PROBLEM — M62.838 MUSCLE SPASM OF LEFT SHOULDER AREA: Status: RESOLVED | Noted: 2022-10-07 | Resolved: 2024-10-21

## 2024-10-21 PROBLEM — R05.3 PERSISTENT COUGH FOR 3 WEEKS OR LONGER: Status: RESOLVED | Noted: 2023-04-13 | Resolved: 2024-10-21

## 2024-10-21 PROCEDURE — 90471 IMMUNIZATION ADMIN: CPT | Performed by: FAMILY MEDICINE

## 2024-10-21 PROCEDURE — 90656 IIV3 VACC NO PRSV 0.5 ML IM: CPT | Performed by: FAMILY MEDICINE

## 2024-10-21 PROCEDURE — 99396 PREV VISIT EST AGE 40-64: CPT | Performed by: FAMILY MEDICINE

## 2024-10-21 RX ORDER — FAMOTIDINE 40 MG/1
40 TABLET, FILM COATED ORAL NIGHTLY PRN
Qty: 90 TABLET | Refills: 3 | Status: SHIPPED | OUTPATIENT
Start: 2024-10-21

## 2024-10-21 RX ORDER — ATORVASTATIN CALCIUM 20 MG/1
20 TABLET, FILM COATED ORAL
Qty: 90 TABLET | Refills: 3 | Status: SHIPPED | OUTPATIENT
Start: 2024-10-21

## 2024-10-21 NOTE — PROGRESS NOTES
Marni Hussein is a 64 y.o. female.    Chief Complaint   Patient presents with    Annual Exam       HPI   History of Present Illness  The patient presents today for an annual physical exam. She is also following up on GERD, hyperlipidemia, and allergies.    She reports a recent flare-up of shoulder pain after lifting a cane, despite previous improvement with treatment.    She continues to take her cholesterol medication and famotidine for stomach issues. She experienced a flare-up of her stomach condition after consuming a large amount of tomatoes, which was managed with generic Nexium and evening doses of famotidine. She inquires about the safety of combining these medications.     She maintains a healthy diet and exercises regularly, although not as much as she would like. She reports no chest pain, gastrointestinal symptoms, or respiratory symptoms.  Mammogram is due next year.  Colonoscopy completed in 2022 with repeat in 2027.  Pap completed earlier this year.      She reports intermittent low energy levels and recent insomnia, which she attributes to caffeine consumption. She also mentions some achiness, which she believes could be alleviated with more walking. She is currently taking over-the-counter calcium supplements and has switched to a plant-based supplement due to difficulty swallowing large pills. She has a history of anemia and vitamin D deficiency and requests testing for these conditions. She had breakfast this morning and will need to return for blood work.    She has not been taking Singulair and does not recall using it. She had a severe reaction to mold exposure a few years ago, which required treatment with five medications, including an inhaler. She also had a severe episode of bronchitis before cintia COVID-19, which required three rounds of treatment. She believes she contracted RSV during the Thanksgiving holiday and reports that her lungs have not fully recovered since then.    She  consulted Dr. Isidro for a sinus infection and was prescribed amoxicillin. She experienced tingling and pressure, which resolved with the medication. However, she still occasionally feels pressure in her temples, which she describes as odd. This has been ongoing for several months but has lessened in intensity.    She continues to take Allegra for her allergies but has not been using Singulair. She experimented with discontinuing Allegra for a period and found that she did better than expected. However, she has had to resume taking it during certain periods, such as the fall, when she experiences drainage in the back of her throat.    She has received her flu shot and is due for a COVID-19 vaccine next week. She expresses apprehension about the shingles vaccine but plans to schedule it. She has been seeing a dermatologist for a skin spot, which she describes as rough.    FAMILY HISTORY  She does not have a family history of ovarian cancer.    The following portions of the patient's history were reviewed and updated as appropriate: allergies, current medications, past family history, past medical history, past social history, past surgical history and problem list.     Past Medical History:   Diagnosis Date    Abnormal Pap smear of cervix     Acute appendicitis with localized peritonitis 09/23/2017    Added automatically from request for surgery [id number]    Allergic     Anemia     Breast injury     bruising as a teenager    Cataract     Cervical dysplasia     Colon polyp     Depression     Endometriosis     Fibrocystic breast     GERD (gastroesophageal reflux disease)     HPV (human papilloma virus) infection     Hyperlipidemia     Osteopenia     Ovarian cyst     Peptic ulceration     Urinary tract infection        Past Surgical History:   Procedure Laterality Date    APPENDECTOMY N/A 09/23/2017    Procedure: APPENDECTOMY LAPAROSCOPIC;  Surgeon: Etienne Branham MD;  Location: [facility];  Service:     BREAST CYST  ASPIRATION Left     BREAST CYST EXCISION  1980    BREAST EXCISIONAL BIOPSY Right     1982    COLPOSCOPY      in her 30's    CRYOTHERAPY      abnormal pap smear in her 30's    DIAGNOSTIC LAPAROSCOPY      age 42, endo    PELVIC LAPAROSCOPY  2002       Family History   Problem Relation Age of Onset    Breast cancer Mother 54    Cancer Mother     Hypertension Mother     ANTOLIN disease Mother     Hyperlipidemia Mother     Heart disease Mother         mild CAD    Asthma Mother     Hypertension Father     Hyperlipidemia Father     Hypertension Sister     Hyperlipidemia Sister     Fibromyalgia Sister     Hypertension Brother     Hyperlipidemia Brother     Diabetes Brother     Hypertension Maternal Aunt     Hyperlipidemia Maternal Aunt     Cancer Maternal Aunt     Hypertension Maternal Uncle     Heart disease Maternal Uncle     Hypertension Paternal Aunt     Hypertension Paternal Uncle     Hyperlipidemia Paternal Uncle     Heart disease Paternal Uncle         pacemaker    Stroke Maternal Grandmother     Heart disease Maternal Grandmother     Hyperlipidemia Maternal Grandmother     Hypertension Maternal Grandmother     Heart disease Maternal Grandfather     Hyperlipidemia Maternal Grandfather     Hypertension Maternal Grandfather     Heart attack Maternal Grandfather     Tuberculosis Paternal Grandmother     Hypertension Paternal Grandfather     Hyperlipidemia Paternal Grandfather     Heart disease Paternal Grandfather     Stroke Paternal Grandfather     Diabetes Paternal Grandfather     Brain cancer Maternal Aunt     Stroke Maternal Aunt     Hypertension Maternal Aunt     Hyperlipidemia Maternal Aunt     Heart disease Maternal Aunt     Hypertension Maternal Uncle     Hyperlipidemia Maternal Uncle     Heart disease Maternal Uncle     Heart attack Maternal Uncle     Stroke Maternal Uncle     Hypertension Paternal Uncle     Hyperlipidemia Paternal Uncle     Heart disease Paternal Uncle     Heart attack Paternal Uncle     Breast  cancer Cousin     Ovarian cancer Neg Hx     Endometrial cancer Neg Hx     Uterine cancer Neg Hx     Colon cancer Neg Hx        Social History     Socioeconomic History    Marital status:    Tobacco Use    Smoking status: Never    Smokeless tobacco: Never   Vaping Use    Vaping status: Never Used   Substance and Sexual Activity    Alcohol use: Yes     Comment: Rare occasions    Drug use: No    Sexual activity: Not Currently     Partners: Male     Birth control/protection: Post-menopausal       Allergies   Allergen Reactions    Sulfa Antibiotics          Current Outpatient Medications:     atorvastatin (LIPITOR) 20 MG tablet, Take 1 tablet by mouth every night at bedtime., Disp: 90 tablet, Rfl: 3    calcium carbonate (TUMS) 500 MG chewable tablet, Chew., Disp: , Rfl:     CALCIUM PO, Take  by mouth., Disp: , Rfl:     famotidine (PEPCID) 40 MG tablet, Take 1 tablet by mouth At Night As Needed for Heartburn., Disp: 90 tablet, Rfl: 3    Fexofenadine HCl (ALLEGRA PO), Take  by mouth., Disp: , Rfl:     ipratropium (ATROVENT) 0.03 % nasal spray, 2 sprays into the nostril(s) as directed by provider 3 (Three) Times a Day As Needed for Rhinitis., Disp: 30 mL, Rfl: 2    VITAMIN D PO, Take  by mouth., Disp: , Rfl:     montelukast (SINGULAIR) 10 MG tablet, TAKE 1 TABLET BY MOUTH EVERY DAY AT NIGHT, Disp: 90 tablet, Rfl: 3    ROS    Review of Systems   Constitutional:  Positive for fatigue (occasional). Negative for chills and fever.   HENT:  Negative for congestion, postnasal drip and sore throat.    Eyes:  Negative for visual disturbance.   Respiratory:  Positive for shortness of breath. Negative for cough.    Cardiovascular:  Negative for chest pain.   Gastrointestinal:  Negative for constipation, diarrhea, nausea and vomiting.   Genitourinary:  Negative for difficulty urinating.   Musculoskeletal:  Positive for arthralgias.   Skin:  Negative for rash.   Allergic/Immunologic: Positive for environmental allergies.  "  Neurological:  Positive for headache (pressure). Negative for weakness and numbness.   Hematological:  Does not bruise/bleed easily.   Psychiatric/Behavioral:  Positive for sleep disturbance. Negative for depressed mood. The patient is not nervous/anxious.        Vitals:    10/21/24 1042   BP: 116/76   Pulse: 66   Temp: 98 °F (36.7 °C)   SpO2: 97%   Weight: 63.5 kg (140 lb)   Height: 167.6 cm (66\")   PainSc:   2     Body mass index is 22.6 kg/m².    Physical Exam     Physical Exam  Constitutional:       General: She is not in acute distress.     Appearance: Normal appearance. She is well-developed.   HENT:      Head: Normocephalic and atraumatic.      Right Ear: Tympanic membrane and external ear normal.      Left Ear: Tympanic membrane and external ear normal.      Mouth/Throat:      Pharynx: No posterior oropharyngeal erythema.   Eyes:      Extraocular Movements: Extraocular movements intact.      Conjunctiva/sclera: Conjunctivae normal.      Pupils: Pupils are equal, round, and reactive to light.   Cardiovascular:      Rate and Rhythm: Normal rate and regular rhythm.      Heart sounds: No murmur heard.  Pulmonary:      Effort: Pulmonary effort is normal. No respiratory distress.      Breath sounds: Normal breath sounds. No wheezing.   Abdominal:      General: Bowel sounds are normal. There is no distension.      Palpations: Abdomen is soft.      Tenderness: There is no abdominal tenderness.   Musculoskeletal:      Cervical back: Neck supple.      Right lower leg: No edema.      Left lower leg: No edema.   Lymphadenopathy:      Cervical: No cervical adenopathy.   Skin:     General: Skin is warm and dry.   Neurological:      Mental Status: She is alert and oriented to person, place, and time.      Cranial Nerves: No cranial nerve deficit.      Deep Tendon Reflexes: Reflexes normal.   Psychiatric:         Mood and Affect: Mood normal.         Behavior: Behavior normal.             Diagnoses and all orders for this " visit:    1. Well adult exam (Primary)    2. Other hyperlipidemia  -     Cancel: Lipid Panel  -     Lipid Panel    3. Seasonal allergic rhinitis, unspecified trigger    4. Gastroesophageal reflux disease with esophagitis, unspecified whether hemorrhage    5. Need for influenza vaccination  -     Fluzone >6mos ([id number])    6. Osteopenia of multiple sites    7. Screening for blood disease  -     Cancel: CBC & Differential  -     CBC & Differential    8. Screening for endocrine disorder  -     Cancel: Comprehensive Metabolic Panel  -     Comprehensive Metabolic Panel    9. Vitamin D deficiency  -     Vitamin D,25-Hydroxy    Other orders  -     atorvastatin (LIPITOR) 20 MG tablet; Take 1 tablet by mouth every night at bedtime.  Dispense: 90 tablet; Refill: 3  -     famotidine (PEPCID) 40 MG tablet; Take 1 tablet by mouth At Night As Needed for Heartburn.  Dispense: 90 tablet; Refill: 3        Assessment & Plan  1. Well adult exam.  Routine health maintenance was discussed, including vaccines, dental/eye health, healthy diet and exercise, colorectal cancer screening, breast cancer screening, and Pap smears. She typically receives her DEXA scans through her gynecologist. Mental health was addressed today. Routine labs have been ordered.    2. Hyperlipidemia.  She will continue taking Lipitor. Lipid panels will be monitored at least yearly.    3. Allergic rhinitis.  She will continue taking Allegra. She was advised to start back on Singulair to help with the smothering sensation.    4. GERD.  Symptoms improved with famotidine and over-the-counter Nexium as needed.             New Medications Ordered This Visit   Medications    atorvastatin (LIPITOR) 20 MG tablet     Sig: Take 1 tablet by mouth every night at bedtime.     Dispense:  90 tablet     Refill:  3    famotidine (PEPCID) 40 MG tablet     Sig: Take 1 tablet by mouth At Night As Needed for Heartburn.     Dispense:  90 tablet     Refill:  3       Orders Placed This  Encounter   Procedures    Fluzone >6mos ([id number])       Return in about 1 year (around 10/21/2025) for Welcome to [employer] Wellness.      Cinthya Miles, DO

## 2024-10-22 RX ORDER — MONTELUKAST SODIUM 10 MG/1
10 TABLET ORAL
Qty: 90 TABLET | Refills: 3 | Status: SHIPPED | OUTPATIENT
Start: 2024-10-22

## 2024-10-23 ENCOUNTER — TELEPHONE (OUTPATIENT)
Dept: INTERNAL MEDICINE | Facility: CLINIC | Age: 64
End: 2024-10-23
Payer: COMMERCIAL

## 2024-10-23 LAB
25(OH)D3+25(OH)D2 SERPL-MCNC: 43.5 NG/ML (ref 30–100)
ALBUMIN SERPL-MCNC: 4.2 G/DL (ref 3.5–5.2)
ALBUMIN/GLOB SERPL: 1.8 G/DL
ALP SERPL-CCNC: 101 U/L (ref 39–117)
ALT SERPL-CCNC: 32 U/L (ref 1–33)
AST SERPL-CCNC: 30 U/L (ref 1–32)
BASOPHILS # BLD AUTO: 0.05 10*3/MM3 (ref 0–0.2)
BASOPHILS NFR BLD AUTO: 0.9 % (ref 0–1.5)
BILIRUB SERPL-MCNC: 0.6 MG/DL (ref 0–1.2)
BUN SERPL-MCNC: 11 MG/DL (ref 8–23)
BUN/CREAT SERPL: 13.1 (ref 7–25)
CALCIUM SERPL-MCNC: 9.6 MG/DL (ref 8.6–10.5)
CHLORIDE SERPL-SCNC: 102 MMOL/L (ref 98–107)
CHOLEST SERPL-MCNC: 196 MG/DL (ref 0–200)
CO2 SERPL-SCNC: 28.9 MMOL/L (ref 22–29)
CREAT SERPL-MCNC: 0.84 MG/DL (ref 0.57–1)
EGFRCR SERPLBLD CKD-EPI 2021: 77.7 ML/MIN/1.73
EOSINOPHIL # BLD AUTO: 0.06 10*3/MM3 (ref 0–0.4)
EOSINOPHIL NFR BLD AUTO: 1.1 % (ref 0.3–6.2)
ERYTHROCYTE [DISTWIDTH] IN BLOOD BY AUTOMATED COUNT: 12.3 % (ref 12.3–15.4)
GLOBULIN SER CALC-MCNC: 2.4 GM/DL
GLUCOSE SERPL-MCNC: 87 MG/DL (ref 65–99)
HCT VFR BLD AUTO: 41.5 % (ref 34–46.6)
HDLC SERPL-MCNC: 74 MG/DL (ref 40–60)
HGB BLD-MCNC: 13.5 G/DL (ref 12–15.9)
IMM GRANULOCYTES # BLD AUTO: 0.01 10*3/MM3 (ref 0–0.05)
IMM GRANULOCYTES NFR BLD AUTO: 0.2 % (ref 0–0.5)
LDLC SERPL CALC-MCNC: 103 MG/DL (ref 0–100)
LYMPHOCYTES # BLD AUTO: 2.53 10*3/MM3 (ref 0.7–3.1)
LYMPHOCYTES NFR BLD AUTO: 45.2 % (ref 19.6–45.3)
MCH RBC QN AUTO: 29.9 PG (ref 26.6–33)
MCHC RBC AUTO-ENTMCNC: 32.5 G/DL (ref 31.5–35.7)
MCV RBC AUTO: 91.8 FL (ref 79–97)
MONOCYTES # BLD AUTO: 0.51 10*3/MM3 (ref 0.1–0.9)
MONOCYTES NFR BLD AUTO: 9.1 % (ref 5–12)
NEUTROPHILS # BLD AUTO: 2.44 10*3/MM3 (ref 1.7–7)
NEUTROPHILS NFR BLD AUTO: 43.5 % (ref 42.7–76)
NRBC BLD AUTO-RTO: 0 /100 WBC (ref 0–0.2)
PLATELET # BLD AUTO: 295 10*3/MM3 (ref 140–450)
POTASSIUM SERPL-SCNC: 4.4 MMOL/L (ref 3.5–5.2)
PROT SERPL-MCNC: 6.6 G/DL (ref 6–8.5)
RBC # BLD AUTO: 4.52 10*6/MM3 (ref 3.77–5.28)
SODIUM SERPL-SCNC: 140 MMOL/L (ref 136–145)
TRIGL SERPL-MCNC: 106 MG/DL (ref 0–150)
VLDLC SERPL CALC-MCNC: 19 MG/DL (ref 5–40)
WBC # BLD AUTO: 5.6 10*3/MM3 (ref 3.4–10.8)

## 2025-01-24 ENCOUNTER — OFFICE VISIT (OUTPATIENT)
Dept: OBSTETRICS AND GYNECOLOGY | Facility: CLINIC | Age: 65
End: 2025-01-24
Payer: COMMERCIAL

## 2025-01-24 VITALS
HEIGHT: 66 IN | BODY MASS INDEX: 23.95 KG/M2 | WEIGHT: 149 LBS | DIASTOLIC BLOOD PRESSURE: 70 MMHG | SYSTOLIC BLOOD PRESSURE: 104 MMHG

## 2025-01-24 DIAGNOSIS — N95.2 ATROPHIC VAGINITIS: ICD-10-CM

## 2025-01-24 DIAGNOSIS — Z01.419 PAP TEST, AS PART OF ROUTINE GYNECOLOGICAL EXAMINATION: Primary | ICD-10-CM

## 2025-01-24 PROCEDURE — 99396 PREV VISIT EST AGE 40-64: CPT | Performed by: OBSTETRICS & GYNECOLOGY

## 2025-01-24 RX ORDER — ESTRADIOL 0.1 MG/G
CREAM VAGINAL
Qty: 42.5 G | Refills: 12 | Status: SHIPPED | OUTPATIENT
Start: 2025-01-24

## 2025-01-24 NOTE — PROGRESS NOTES
Gynecologic Annual Exam Note        GYN Annual Exam     CC - Here for annual exam.        ISSA  Marni Hussein is a 64 y.o. female, , who presents for annual well woman exam as a established patient.  She is postmenopausal.. Denies vaginal bleeding.   There were no changes to her medical or surgical history since her last visit. Marital Status: .  She is not currently sexually active. STD testing recommendations have been explained to the patient and she declines STD testing.    The patient would like to discuss the following complaints today: none    Additional OB/GYN History   On HRT? No    Last Pap : 2024. Results: negative.   Last Completed Pap Smear            Ordered - PAP SMEAR (Every 5 Years) Ordered on 2024  LIQUID-BASED PAP SMEAR WITH HPV GENOTYPING IF ASCUS ([facility])    2022  LIQUID-BASED PAP SMEAR, [facility] ([facility])    2021  SCANNED - PAP SMEAR    2020  Pap IG, Rfx HPV ASCU    06/15/2016  Done    Only the first 5 history entries have been loaded, but more history exists.                  History of abnormal Pap smear: yes - colpo and cryo  Family history of uterine, colon, breast, or ovarian cancer: yes - Breast: mother, M cousin. Colon: [facility]  Performs monthly Self-Breast Exam: yes  Last mammogram: 10/17/2024. Done at . There is a copy in the chart.    Last Completed Mammogram            Ordered - MAMMOGRAM (Every 2 Years) Ordered on 2025      10/17/2024  Mammo Screening Digital Tomosynthesis Bilateral With CAD    10/12/2023  Mammo Screening Digital Tomosynthesis Bilateral With CAD    10/11/2022  Mammo Screening Digital Tomosynthesis Bilateral With CAD    10/18/2021  Mammo Screening Digital Tomosynthesis Bilateral With CAD    10/15/2020  Mammo Screening Digital Tomosynthesis Bilateral With CAD    Only the first 5 history entries have been loaded, but more history exists.                  Last colonoscopy: has had a  colonoscopy 3 years ago    Last Completed Colonoscopy            COLORECTAL CANCER SCREENING (COLONOSCOPY - Every 5 Years) Next due on 2022  SCANNED - COLONOSCOPY    2022  Fecal Occult Blood component of POC Occult Blood Stool    2022  Fecal Occult Blood component of POC Occult Blood Stool    2022  Fecal Occult Blood component of POC Occult Blood Stool    2016  SCANNED - COLONOSCOPY    Only the first 5 history entries have been loaded, but more history exists.                    Her last bone density scan was 2022  and results were Osteopenia  Exercises Regularly: yes  Feelings of Anxiety or Depression: no      Tobacco Usage?: No       Current Outpatient Medications:     atorvastatin (LIPITOR) 20 MG tablet, Take 1 tablet by mouth every night at bedtime., Disp: 90 tablet, Rfl: 3    calcium carbonate (TUMS) 500 MG chewable tablet, Chew., Disp: , Rfl:     CALCIUM PO, Take  by mouth., Disp: , Rfl:     famotidine (PEPCID) 40 MG tablet, Take 1 tablet by mouth At Night As Needed for Heartburn., Disp: 90 tablet, Rfl: 3    Fexofenadine HCl (ALLEGRA PO), Take  by mouth., Disp: , Rfl:     ipratropium (ATROVENT) 0.03 % nasal spray, 2 sprays into the nostril(s) as directed by provider 3 (Three) Times a Day As Needed for Rhinitis., Disp: 30 mL, Rfl: 2    montelukast (SINGULAIR) 10 MG tablet, TAKE 1 TABLET BY MOUTH EVERY DAY AT NIGHT, Disp: 90 tablet, Rfl: 3    VITAMIN D PO, Take  by mouth., Disp: , Rfl:     Patient denies the need for medication refills today.    OB History          0    Para   0    Term   0       0    AB   0    Living   0         SAB   0    IAB   0    Ectopic   0    Molar        Multiple   0    Live Births   0                Past Medical History:   Diagnosis Date    Abnormal Pap smear of cervix     Acute appendicitis with localized peritonitis 2017    Added automatically from request for surgery [id number]    Allergic     Anemia     Breast  injury     bruising as a teenager    Cataract     Cervical dysplasia     Colon polyp     Depression     Endometriosis     Fibrocystic breast     GERD (gastroesophageal reflux disease)     HPV (human papilloma virus) infection     Hyperlipidemia     Osteopenia     Ovarian cyst     Peptic ulceration     Urinary tract infection         Past Surgical History:   Procedure Laterality Date    APPENDECTOMY N/A 09/23/2017    Procedure: APPENDECTOMY LAPAROSCOPIC;  Surgeon: Etienne Branham MD;  Location: [facility];  Service:     BREAST CYST ASPIRATION Left     BREAST CYST EXCISION  1980    BREAST EXCISIONAL BIOPSY Right     1982    COLPOSCOPY      in her 30's    CRYOTHERAPY      abnormal pap smear in her 30's    DIAGNOSTIC LAPAROSCOPY      age 42, endo    PELVIC LAPAROSCOPY  2002       Health Maintenance   Topic Date Due    ZOSTER VACCINE (1 of 2) Never done    TDAP/TD VACCINES (2 - Td or Tdap) 07/01/2025    ANNUAL PHYSICAL  10/21/2025    LIPID PANEL  10/23/2025    MAMMOGRAM  10/17/2026    COLORECTAL CANCER SCREENING  05/12/2027    PAP SMEAR  01/04/2029    COVID-19 Vaccine  Completed    INFLUENZA VACCINE  Completed    Pneumococcal Vaccine 0-64  Aged Out    HEPATITIS C SCREENING  Discontinued    Annual Gynecologic Pelvic and Breast Exam  Discontinued       The additional following portions of the patient's history were reviewed and updated as appropriate: allergies, current medications, past family history, past medical history, past social history, past surgical history, and problem list.    Review of Systems   Constitutional: Negative.    HENT: Negative.     Eyes: Negative.    Respiratory: Negative.     Cardiovascular: Negative.    Gastrointestinal: Negative.    Endocrine: Negative.    Genitourinary: Negative.    Musculoskeletal: Negative.    Skin: Negative.    Allergic/Immunologic: Negative.    Neurological: Negative.    Hematological: Negative.    Psychiatric/Behavioral: Negative.         I have reviewed and agree with the  "HPI, ROS, and historical information as entered above. Angel Swanson MD      Objective   /70 (BP Location: Right arm)   Ht 167.6 cm (66\")   Wt 67.6 kg (149 lb)   BMI 24.05 kg/m²     Physical Exam  Vitals and nursing note reviewed. Exam conducted with a chaperone present.   Constitutional:       Appearance: She is well-developed.   HENT:      Head: Normocephalic and atraumatic.   Neck:      Thyroid: No thyroid mass or thyromegaly.   Cardiovascular:      Rate and Rhythm: Normal rate and regular rhythm.      Heart sounds: No murmur heard.  Pulmonary:      Effort: Pulmonary effort is normal. No retractions.      Breath sounds: Normal breath sounds. No wheezing, rhonchi or rales.   Chest:      Chest wall: No mass or tenderness.   Breasts:     Right: Normal. No mass, nipple discharge, skin change or tenderness.      Left: Normal. No mass, nipple discharge, skin change or tenderness.   Abdominal:      General: Bowel sounds are normal.      Palpations: Abdomen is soft. Abdomen is not rigid. There is no mass.      Tenderness: There is no abdominal tenderness. There is no guarding.      Hernia: No hernia is present. There is no hernia in the left inguinal area.   Genitourinary:     Labia:         Right: No rash, tenderness or lesion.         Left: No rash, tenderness or lesion.       Vagina: Normal. No vaginal discharge or lesions.      Cervix: No cervical motion tenderness, discharge, lesion or cervical bleeding.      Uterus: Normal. Not enlarged, not fixed and not tender.       Adnexa:         Right: No mass or tenderness.          Left: No mass or tenderness.        Rectum: No external hemorrhoid.   Musculoskeletal:      Cervical back: Normal range of motion. No muscular tenderness.   Neurological:      Mental Status: She is alert and oriented to person, place, and time.   Psychiatric:         Behavior: Behavior normal.            Assessment and Plan    Problem List Items Addressed This Visit    None  Visit " Diagnoses       Pap test, as part of routine gynecological examination    -  Primary    Relevant Orders    LIQUID-BASED PAP SMEAR WITH HPV GENOTYPING IF ASCUS ([facility])    Mammo Screening Digital Tomosynthesis Bilateral With CAD    Atrophic vaginitis              Will use [facility]   GYN annual well woman exam.   Reviewed monthly self breast exams.  Instructed to call with lumps, pain, or breast discharge.  Yearly mammograms ordered.  Ordered mammogram today.  Recommended use of Vitamin D and getting adequate calcium in her diet. (1500mg)  Reviewed exercise as a preventative health measures.   Reviewed BMI and weight loss as preventative health measures.   Colonoscopy recommended.  Reviewed risks of ERT including increased risk of breast cancer, increased blood clots, increased heart disease.  Patient strongly desires to stay on or start ERT.  She understands we will use the lowest amount that adequately controls her symptoms.  RTC in 1 year or PRN with problems.  No follow-ups on file.         Angel Swanson MD  01/24/2025

## 2025-01-27 LAB — REF LAB TEST METHOD: NORMAL

## 2025-04-29 ENCOUNTER — TELEPHONE (OUTPATIENT)
Dept: INTERNAL MEDICINE | Facility: CLINIC | Age: 65
End: 2025-04-29
Payer: COMMERCIAL

## 2025-04-29 NOTE — TELEPHONE ENCOUNTER
Caller: Marni Hussein    Relationship: Self    Best call back number: [phone]     What orders are you requesting (i.e. lab or imaging): GLUCOSE LEVEL, A1C, LABS FOR LITTLE CAPILLARIES THAT HAD BLED AND STATES THIS IS SUGGESTIVE OF DIABETES.    In what timeframe would the patient need to come in: AS SOON AS POSSIBLE    Where will you receive your lab/imaging services: IN OFFICE DOWNSTAIRS    Additional notes: DR DE SOUZA, [facility] [place] WANTED DR BAKER TO ORDER LABS RELATED TO THE ISSUE WITH HER EYES DISCOVERED 04/15/25.     Dr. Delfino De Souza, O.D  Address: [address] [place] [address] , [place] [address]  Phone: [phone]    PATIENT HAS NOT HEARD BACK TO HAVE THE LABS DONE. ASKING FOR A CALL BACK. PATIENT HAS NOT HEARD BACK FROM WHEN SHE CALLED LAST WEEK.     PLEASE CALL  AND ADVISE IF ANY LABS ARE FASTING.

## 2025-05-01 ENCOUNTER — OFFICE VISIT (OUTPATIENT)
Dept: INTERNAL MEDICINE | Facility: CLINIC | Age: 65
End: 2025-05-01
Payer: COMMERCIAL

## 2025-05-01 VITALS
SYSTOLIC BLOOD PRESSURE: 118 MMHG | HEART RATE: 75 BPM | BODY MASS INDEX: 23.82 KG/M2 | TEMPERATURE: 96.7 F | HEIGHT: 66 IN | WEIGHT: 148.2 LBS | OXYGEN SATURATION: 96 % | DIASTOLIC BLOOD PRESSURE: 72 MMHG

## 2025-05-01 DIAGNOSIS — M19.049 PRIMARY OSTEOARTHRITIS OF HAND, UNSPECIFIED LATERALITY: ICD-10-CM

## 2025-05-01 DIAGNOSIS — R73.9 HYPERGLYCEMIA: Primary | ICD-10-CM

## 2025-05-01 DIAGNOSIS — H35.60 RETINAL HEMORRHAGE, UNSPECIFIED LATERALITY: ICD-10-CM

## 2025-05-01 DIAGNOSIS — Z78.0 POSTMENOPAUSAL: ICD-10-CM

## 2025-05-01 DIAGNOSIS — M85.89 OSTEOPENIA OF MULTIPLE SITES: ICD-10-CM

## 2025-05-01 LAB
EXPIRATION DATE: ABNORMAL
HBA1C MFR BLD: 6 % (ref 4.5–5.7)
Lab: ABNORMAL

## 2025-05-01 RX ORDER — LEVOCETIRIZINE DIHYDROCHLORIDE 5 MG/1
1 TABLET, FILM COATED ORAL DAILY
COMMUNITY
Start: 2025-04-15

## 2025-05-01 RX ORDER — BUDESONIDE AND FORMOTEROL FUMARATE DIHYDRATE 160; 4.5 UG/1; UG/1
AEROSOL RESPIRATORY (INHALATION)
COMMUNITY
Start: 2025-03-24

## 2025-05-01 NOTE — PROGRESS NOTES
Marni Hussein is a 64 y.o. female.    Chief Complaint   Patient presents with    Eye Problem     Pt is here today because eye doctor seen capillaries in her eye exam and suggested that she have some blood work done to check for DM.       HPI   History of Present Illness  The patient presents with concern for possible diabetes due to retinal hemorrhage noted on eye exam.    Ophthalmological examination last month revealed retinal hemorrhages, possibly related to diabetes. Family history includes borderline diabetes in her father and several relatives diagnosed with diabetes. Despite normal glucose readings in the mid-80s and an A1c of 6.0 indicating prediabetes, she acknowledges high sugar and carbohydrate intake influenced by her 's dietary habits. She has gained approximately 20 pounds over the past 3 years, attributed to natural changes by her gynecologist.    Concerns about potential osteoporosis and hesitant to start treatment due to risk of osteonecrosis. Medication regimen includes vitamin D supplements and a cholesterol-lowering drug. Increased milk and cheese consumption for calcium.    Reports intermittent joint pain in fingers, lasting 2 to 3 days, with knot-like swelling in one finger. Curious if symptoms could be medication side effects.        The following portions of the patient's history were reviewed and updated as appropriate: allergies, current medications, past family history, past medical history, past social history, past surgical history and problem list.     Allergies   Allergen Reactions    Sulfa Antibiotics          Current Outpatient Medications:     atorvastatin (LIPITOR) 20 MG tablet, Take 1 tablet by mouth every night at bedtime., Disp: 90 tablet, Rfl: 3    budesonide-formoterol (SYMBICORT) 160-4.5 MCG/ACT inhaler, INHALE 2 puffs BY MOUTH every 12 hours. Use with spacer. Rinse mouth after each use, Disp: , Rfl:     calcium carbonate (TUMS) 500 MG chewable tablet, Chew., Disp:  ", Rfl:     CALCIUM PO, Take  by mouth., Disp: , Rfl:     estradiol (ESTRACE VAGINAL) 0.1 MG/GM vaginal cream, Use 2 gms vag qhs x 2 weeks then 2x a week, Disp: 42.5 g, Rfl: 12    famotidine (PEPCID) 40 MG tablet, Take 1 tablet by mouth At Night As Needed for Heartburn., Disp: 90 tablet, Rfl: 3    ipratropium (ATROVENT) 0.03 % nasal spray, 2 sprays into the nostril(s) as directed by provider 3 (Three) Times a Day As Needed for Rhinitis., Disp: 30 mL, Rfl: 2    levocetirizine (XYZAL) 5 MG tablet, Take 1 tablet by mouth Daily., Disp: , Rfl:     montelukast (SINGULAIR) 10 MG tablet, TAKE 1 TABLET BY MOUTH EVERY DAY AT NIGHT, Disp: 90 tablet, Rfl: 3    VITAMIN D PO, Take  by mouth., Disp: , Rfl:     Fexofenadine HCl (ALLEGRA PO), Take  by mouth. (Patient not taking: Reported on 5/1/2025), Disp: , Rfl:     ROS    Review of Systems   Constitutional:  Positive for fatigue. Negative for chills and fever.   Respiratory:  Positive for cough and shortness of breath.    Cardiovascular:  Negative for chest pain.   Musculoskeletal:  Positive for arthralgias and joint swelling.       Vitals:    05/01/25 1431   BP: 118/72   Pulse: 75   Temp: 96.7 °F (35.9 °C)   TempSrc: Infrared   SpO2: 96%   Weight: 67.2 kg (148 lb 3.2 oz)   Height: 167.6 cm (65.98\")   PainSc: 0-No pain         Physical Exam     Physical Exam  Constitutional:       General: She is not in acute distress.     Appearance: Normal appearance. She is well-developed.   HENT:      Head: Normocephalic and atraumatic.      Right Ear: External ear normal.      Left Ear: External ear normal.   Eyes:      Extraocular Movements: Extraocular movements intact.      Conjunctiva/sclera: Conjunctivae normal.   Cardiovascular:      Rate and Rhythm: Normal rate and regular rhythm.      Heart sounds: No murmur heard.  Pulmonary:      Effort: Pulmonary effort is normal. No respiratory distress.      Breath sounds: Normal breath sounds. No wheezing.   Abdominal:      General: Bowel sounds " are normal. There is no distension.      Palpations: Abdomen is soft.      Tenderness: There is no abdominal tenderness.   Musculoskeletal:         General: Deformity (santiago's node right 5ht finger) present.   Skin:     General: Skin is warm and dry.   Neurological:      Mental Status: She is alert and oriented to person, place, and time.      Cranial Nerves: No cranial nerve deficit.   Psychiatric:         Mood and Affect: Mood normal.         Behavior: Behavior normal.             Diagnoses and all orders for this visit:    1. Hyperglycemia (Primary)    2. Primary osteoarthritis of hand, unspecified laterality    3. Retinal hemorrhage, unspecified laterality  -     POC Glycosylated Hemoglobin (Hb A1C)    4. Postmenopausal  -     DEXA Bone Density Axial    5. Osteopenia of multiple sites        Assessment & Plan  1. Hyperglycemia.  - A1c level at 6.0, indicating prediabetes.  - Advised to monitor diet, reduce carbohydrates and refined sugars, and increase aerobic activities like walking after dinner.    2. Arthritis of hand.  - Pain in fingers likely due to osteoarthritis, evidenced by Santiago's nodes.  - Advised to apply Voltaren gel twice daily for pain relief.    3. Retinal hemorrhage.  - Retinal hemorrhage potentially secondary to chronic cough from respiratory illnesses or elevated glucose levels.  - Will continue to see eye doctor for monitoring.    4. Osteopenia.  - Diagnosed with osteopenia, concerned about progression to osteoporosis.  - Taking vitamin D supplements and increased milk and cheese intake for calcium.  - Bone density scan will be ordered.          No orders of the defined types were placed in this encounter.      No orders of the defined types were placed in this encounter.      Return for Next scheduled follow up.    Cinthya Miles DO

## (undated) DEVICE — CUFF SCD HEMOFORCE SEQ CALF STD MD

## (undated) DEVICE — SOL IRR H2O BTL 1000ML STRL

## (undated) DEVICE — TROCAR SITE CLOSURE DEVICE: Brand: ENDO CLOSE

## (undated) DEVICE — MONOPOLAR METZENBAUM SCISSOR TIP, DISPOSABLE: Brand: MONOPOLAR METZENBAUM SCISSOR TIP, DISPOSABLE

## (undated) DEVICE — ENDOPATH ETS-FLEX45 ARTICULATING ENDOSCOPIC LINEAR CUTTER, NO RELOAD: Brand: ENDOPATH

## (undated) DEVICE — 2, DISPOSABLE SUCTION/IRRIGATOR WITHOUT DISPOSABLE TIP: Brand: STRYKEFLOW

## (undated) DEVICE — ENDOPATH XCEL UNIVERSAL TROCAR STABLILITY SLEEVES: Brand: ENDOPATH XCEL

## (undated) DEVICE — ENDOPOUCH RETRIEVER SPECIMEN RETRIEVAL BAGS: Brand: ENDOPOUCH RETRIEVER

## (undated) DEVICE — ENDOPATH XCEL BLADELESS TROCARS WITH STABILITY SLEEVES: Brand: ENDOPATH XCEL

## (undated) DEVICE — RICH GENERAL LAPAROSCOPY: Brand: MEDLINE INDUSTRIES, INC.

## (undated) DEVICE — SUT VIC 0/0 UR6 27IN DYED J603H

## (undated) DEVICE — GLV SURG SENSICARE W/ALOE PF LF 8.5 STRL

## (undated) DEVICE — UNDYED BRAIDED (POLYGLACTIN 910), SYNTHETIC ABSORBABLE SUTURE: Brand: COATED VICRYL